# Patient Record
Sex: FEMALE | Race: BLACK OR AFRICAN AMERICAN | Employment: OTHER | URBAN - NONMETROPOLITAN AREA
[De-identification: names, ages, dates, MRNs, and addresses within clinical notes are randomized per-mention and may not be internally consistent; named-entity substitution may affect disease eponyms.]

---

## 2017-07-24 ENCOUNTER — HOSPITAL ENCOUNTER (OUTPATIENT)
Dept: CT IMAGING | Age: 57
Discharge: HOME | End: 2017-07-24
Payer: COMMERCIAL

## 2017-07-24 VITALS
BODY MASS INDEX: 37.21 KG/M2 | DIASTOLIC BLOOD PRESSURE: 68 MMHG | WEIGHT: 210 LBS | HEIGHT: 63 IN | SYSTOLIC BLOOD PRESSURE: 136 MMHG | OXYGEN SATURATION: 99 % | HEART RATE: 62 BPM | TEMPERATURE: 97.4 F | RESPIRATION RATE: 17 BRPM

## 2017-07-24 DIAGNOSIS — C85.90 LYMPHOMA, UNSPECIFIED BODY REGION, UNSPECIFIED LYMPHOMA TYPE (HCC): ICD-10-CM

## 2017-07-24 DIAGNOSIS — D46.9 MDS (MYELODYSPLASTIC SYNDROME) (HCC): ICD-10-CM

## 2017-07-24 LAB
BASOPHILS ABSOLUTE: 0.1 K/UL (ref 0–0.2)
BASOPHILS RELATIVE PERCENT: 0.6 % (ref 0–1)
EOSINOPHILS ABSOLUTE: 0.1 K/UL (ref 0–0.6)
EOSINOPHILS RELATIVE PERCENT: 1.6 % (ref 0–5)
HCT VFR BLD CALC: 36.2 % (ref 37–47)
HEMOGLOBIN: 10.8 G/DL (ref 12–16)
LYMPHOCYTES ABSOLUTE: 0.9 K/UL (ref 1.1–4.5)
LYMPHOCYTES RELATIVE PERCENT: 11.2 % (ref 20–40)
MCH RBC QN AUTO: 26.8 PG (ref 27–31)
MCHC RBC AUTO-ENTMCNC: 29.8 G/DL (ref 33–37)
MCV RBC AUTO: 89.8 FL (ref 81–99)
MONOCYTES ABSOLUTE: 0.6 K/UL (ref 0–0.9)
MONOCYTES RELATIVE PERCENT: 7 % (ref 0–10)
NEUTROPHILS ABSOLUTE: 6.4 K/UL (ref 1.5–7.5)
NEUTROPHILS RELATIVE PERCENT: 78.9 % (ref 50–65)
PDW BLD-RTO: 17.5 % (ref 11.5–14.5)
PLATELET # BLD: 227 K/UL (ref 130–400)
PMV BLD AUTO: 11.6 FL (ref 9.4–12.3)
RBC # BLD: 4.03 M/UL (ref 4.2–5.4)
WBC # BLD: 8.1 K/UL (ref 4.8–10.8)

## 2017-07-24 PROCEDURE — 88305 TISSUE EXAM BY PATHOLOGIST: CPT

## 2017-07-24 PROCEDURE — 88311 DECALCIFY TISSUE: CPT

## 2017-07-24 PROCEDURE — 88185 FLOWCYTOMETRY/TC ADD-ON: CPT

## 2017-07-24 PROCEDURE — 85025 COMPLETE CBC W/AUTO DIFF WBC: CPT

## 2017-07-24 PROCEDURE — 6360000002 HC RX W HCPCS: Performed by: RADIOLOGY

## 2017-07-24 PROCEDURE — 88184 FLOWCYTOMETRY/ TC 1 MARKER: CPT

## 2017-07-24 PROCEDURE — 88313 SPECIAL STAINS GROUP 2: CPT

## 2017-07-24 PROCEDURE — 2500000003 HC RX 250 WO HCPCS: Performed by: RADIOLOGY

## 2017-07-24 PROCEDURE — 38221 DX BONE MARROW BIOPSIES: CPT

## 2017-07-24 RX ORDER — FENTANYL CITRATE 50 UG/ML
INJECTION, SOLUTION INTRAMUSCULAR; INTRAVENOUS
Status: COMPLETED | OUTPATIENT
Start: 2017-07-24 | End: 2017-07-24

## 2017-07-24 RX ORDER — CARBOXYMETHYLCELLULOSE SODIUM 5 MG/ML
1 SOLUTION/ DROPS OPHTHALMIC 4 TIMES DAILY PRN
COMMUNITY

## 2017-07-24 RX ORDER — ACETAMINOPHEN 500 MG
500 TABLET ORAL 3 TIMES DAILY PRN
COMMUNITY

## 2017-07-24 RX ORDER — LEVOTHYROXINE SODIUM 175 UG/1
175 TABLET ORAL DAILY
COMMUNITY

## 2017-07-24 RX ORDER — MIDAZOLAM HYDROCHLORIDE 1 MG/ML
INJECTION INTRAMUSCULAR; INTRAVENOUS
Status: COMPLETED | OUTPATIENT
Start: 2017-07-24 | End: 2017-07-24

## 2017-07-24 RX ORDER — M-VIT,TX,IRON,MINS/CALC/FOLIC 27MG-0.4MG
1 TABLET ORAL DAILY
COMMUNITY

## 2017-07-24 RX ORDER — HYDROCHLOROTHIAZIDE 25 MG/1
25 TABLET ORAL DAILY
COMMUNITY

## 2017-07-24 RX ORDER — LIDOCAINE HYDROCHLORIDE 20 MG/ML
INJECTION, SOLUTION INFILTRATION; PERINEURAL
Status: COMPLETED | OUTPATIENT
Start: 2017-07-24 | End: 2017-07-24

## 2017-07-24 RX ORDER — ISOSORBIDE MONONITRATE 60 MG/1
60 TABLET, EXTENDED RELEASE ORAL DAILY
COMMUNITY

## 2017-07-24 RX ORDER — SODIUM CHLORIDE 0.9 % (FLUSH) 0.9 %
10 SYRINGE (ML) INJECTION PRN
Status: DISCONTINUED | OUTPATIENT
Start: 2017-07-24 | End: 2017-07-26 | Stop reason: HOSPADM

## 2017-07-24 RX ORDER — LIDOCAINE HYDROCHLORIDE 20 MG/ML
INJECTION, SOLUTION INFILTRATION; PERINEURAL
Status: DISPENSED
Start: 2017-07-24 | End: 2017-07-24

## 2017-07-24 RX ORDER — FENTANYL CITRATE 50 UG/ML
INJECTION, SOLUTION INTRAMUSCULAR; INTRAVENOUS
Status: DISCONTINUED
Start: 2017-07-24 | End: 2017-07-24 | Stop reason: WASHOUT

## 2017-07-24 RX ORDER — SODIUM CHLORIDE 0.9 % (FLUSH) 0.9 %
10 SYRINGE (ML) INJECTION EVERY 12 HOURS SCHEDULED
Status: DISCONTINUED | OUTPATIENT
Start: 2017-07-24 | End: 2017-07-26 | Stop reason: HOSPADM

## 2017-07-24 RX ORDER — SODIUM CHLORIDE 0.9 % (FLUSH) 0.9 %
10 SYRINGE (ML) INJECTION PRN
Status: DISCONTINUED | OUTPATIENT
Start: 2017-07-24 | End: 2017-07-24 | Stop reason: CLARIF

## 2017-07-24 RX ORDER — MIDAZOLAM HYDROCHLORIDE 1 MG/ML
INJECTION INTRAMUSCULAR; INTRAVENOUS
Status: DISCONTINUED
Start: 2017-07-24 | End: 2017-07-24 | Stop reason: WASHOUT

## 2017-07-24 RX ORDER — FOLIC ACID 1 MG/1
1 TABLET ORAL DAILY
COMMUNITY

## 2017-07-24 RX ORDER — CHLORAL HYDRATE 500 MG
3000 CAPSULE ORAL 3 TIMES DAILY
COMMUNITY

## 2017-07-24 RX ORDER — TRAMADOL HYDROCHLORIDE 50 MG/1
50 TABLET ORAL EVERY 8 HOURS PRN
COMMUNITY

## 2017-07-24 RX ORDER — LORATADINE 10 MG/1
10 CAPSULE, LIQUID FILLED ORAL DAILY
COMMUNITY

## 2017-07-24 RX ORDER — POTASSIUM CHLORIDE 1.5 G/1.77G
20 POWDER, FOR SOLUTION ORAL 2 TIMES DAILY
COMMUNITY

## 2017-07-24 RX ORDER — FUROSEMIDE 40 MG/1
40 TABLET ORAL 2 TIMES DAILY
COMMUNITY

## 2017-07-24 RX ADMIN — FENTANYL CITRATE 25 MCG: 50 INJECTION INTRAMUSCULAR; INTRAVENOUS at 11:34

## 2017-07-24 RX ADMIN — LIDOCAINE HYDROCHLORIDE 5 ML: 20 INJECTION, SOLUTION INFILTRATION; PERINEURAL at 11:24

## 2017-07-24 RX ADMIN — MIDAZOLAM HYDROCHLORIDE 1 MG: 1 INJECTION, SOLUTION INTRAMUSCULAR; INTRAVENOUS at 11:34

## 2017-07-24 RX ADMIN — MIDAZOLAM HYDROCHLORIDE 1 MG: 1 INJECTION, SOLUTION INTRAMUSCULAR; INTRAVENOUS at 11:24

## 2017-07-24 RX ADMIN — FENTANYL CITRATE 25 MCG: 50 INJECTION INTRAMUSCULAR; INTRAVENOUS at 11:24

## 2017-07-24 RX ADMIN — FENTANYL CITRATE 50 MCG: 50 INJECTION INTRAMUSCULAR; INTRAVENOUS at 11:43

## 2017-07-24 ASSESSMENT — PAIN - FUNCTIONAL ASSESSMENT
PAIN_FUNCTIONAL_ASSESSMENT: 0-10
PAIN_FUNCTIONAL_ASSESSMENT: 0-10

## 2017-11-13 RX ORDER — FUROSEMIDE 20 MG/1
20 TABLET ORAL DAILY PRN
COMMUNITY

## 2017-11-13 RX ORDER — POTASSIUM CHLORIDE 1.5 G/1.77G
20 POWDER, FOR SOLUTION ORAL DAILY
COMMUNITY

## 2017-11-13 RX ORDER — ALBUTEROL SULFATE 2.5 MG/3ML
2.5 SOLUTION RESPIRATORY (INHALATION) EVERY 4 HOURS PRN
Status: ON HOLD | COMMUNITY
End: 2019-09-09

## 2017-11-13 RX ORDER — LEVOTHYROXINE SODIUM 0.07 MG/1
175 TABLET ORAL DAILY
COMMUNITY

## 2017-11-13 RX ORDER — HYDROCHLOROTHIAZIDE 25 MG/1
25 TABLET ORAL DAILY
Status: ON HOLD | COMMUNITY
End: 2020-11-02

## 2017-11-13 RX ORDER — TRAMADOL HYDROCHLORIDE 50 MG/1
50 TABLET ORAL 3 TIMES DAILY PRN
COMMUNITY

## 2017-11-13 RX ORDER — ACETAMINOPHEN 500 MG
500 TABLET ORAL EVERY 6 HOURS PRN
Status: ON HOLD | COMMUNITY
End: 2019-09-09

## 2017-11-13 RX ORDER — ISOSORBIDE MONONITRATE 120 MG/1
120 TABLET, EXTENDED RELEASE ORAL DAILY
COMMUNITY

## 2017-11-13 RX ORDER — OMEPRAZOLE 20 MG/1
20 CAPSULE, DELAYED RELEASE ORAL DAILY
Status: ON HOLD | COMMUNITY
End: 2020-11-02

## 2017-11-13 RX ORDER — FOLIC ACID 1 MG/1
1 TABLET ORAL DAILY
Status: ON HOLD | COMMUNITY
End: 2020-11-02

## 2017-11-13 RX ORDER — LORATADINE 10 MG/1
10 TABLET ORAL DAILY
COMMUNITY

## 2017-11-13 RX ORDER — CEPHALEXIN 500 MG/1
500 CAPSULE ORAL 2 TIMES DAILY
COMMUNITY
End: 2019-09-12 | Stop reason: HOSPADM

## 2017-11-13 RX ORDER — CHLORAL HYDRATE 500 MG
1000 CAPSULE ORAL 2 TIMES DAILY WITH MEALS
COMMUNITY

## 2017-11-13 RX ORDER — TRIAMCINOLONE ACETONIDE 1 MG/G
CREAM TOPICAL 2 TIMES DAILY
Status: ON HOLD | COMMUNITY
End: 2020-11-02

## 2017-11-13 RX ORDER — PANTOPRAZOLE SODIUM 40 MG/1
40 TABLET, DELAYED RELEASE ORAL DAILY
Status: ON HOLD | COMMUNITY
End: 2020-11-02

## 2019-09-09 ENCOUNTER — HOSPITAL ENCOUNTER (INPATIENT)
Facility: HOSPITAL | Age: 59
LOS: 3 days | Discharge: HOME | End: 2019-09-12
Attending: INTERNAL MEDICINE | Admitting: INTERNAL MEDICINE

## 2019-09-09 DIAGNOSIS — D64.9 SYMPTOMATIC ANEMIA: Primary | ICD-10-CM

## 2019-09-09 PROBLEM — D62 ACUTE ON CHRONIC BLOOD LOSS ANEMIA: Status: ACTIVE | Noted: 2019-09-09

## 2019-09-09 PROBLEM — J44.9 COPD (CHRONIC OBSTRUCTIVE PULMONARY DISEASE) (HCC): Status: ACTIVE | Noted: 2019-09-09

## 2019-09-09 PROBLEM — M32.9 LUPUS (HCC): Status: ACTIVE | Noted: 2019-09-09

## 2019-09-09 PROBLEM — E66.01 MORBID OBESITY (HCC): Status: ACTIVE | Noted: 2019-09-09

## 2019-09-09 PROBLEM — K92.2 GI BLEED: Status: ACTIVE | Noted: 2019-09-09

## 2019-09-09 LAB
ABO GROUP BLD: NORMAL
ALBUMIN SERPL-MCNC: 4 G/DL (ref 3.5–5)
ALBUMIN/GLOB SERPL: 1.2 G/DL (ref 1.1–2.5)
ALP SERPL-CCNC: 71 U/L (ref 24–120)
ALT SERPL W P-5'-P-CCNC: 21 U/L (ref 0–54)
ANION GAP SERPL CALCULATED.3IONS-SCNC: 5 MMOL/L (ref 4–13)
AST SERPL-CCNC: 33 U/L (ref 7–45)
BASOPHILS # BLD AUTO: 0.05 10*3/MM3 (ref 0–0.2)
BASOPHILS NFR BLD AUTO: 0.6 % (ref 0–1.5)
BILIRUB SERPL-MCNC: 0.5 MG/DL (ref 0.1–1)
BLD GP AB SCN SERPL QL: NEGATIVE
BUN BLD-MCNC: 17 MG/DL (ref 5–21)
BUN/CREAT SERPL: 25.4 (ref 7–25)
C AG RBC QL: NEGATIVE
CALCIUM SPEC-SCNC: 9 MG/DL (ref 8.4–10.4)
CHLORIDE SERPL-SCNC: 103 MMOL/L (ref 98–110)
CO2 SERPL-SCNC: 31 MMOL/L (ref 24–31)
CREAT BLD-MCNC: 0.67 MG/DL (ref 0.5–1.4)
DEPRECATED RDW RBC AUTO: 47.7 FL (ref 37–54)
EOSINOPHIL # BLD AUTO: 0.08 10*3/MM3 (ref 0–0.4)
EOSINOPHIL NFR BLD AUTO: 0.9 % (ref 0.3–6.2)
ERYTHROCYTE [DISTWIDTH] IN BLOOD BY AUTOMATED COUNT: 15.9 % (ref 12.3–15.4)
GFR SERPL CREATININE-BSD FRML MDRD: 110 ML/MIN/1.73
GLOBULIN UR ELPH-MCNC: 3.3 GM/DL
GLUCOSE BLD-MCNC: 91 MG/DL (ref 70–100)
HCT VFR BLD AUTO: 20.2 % (ref 34–46.6)
HGB BLD-MCNC: 5.9 G/DL (ref 12–15.9)
IMM GRANULOCYTES # BLD AUTO: 0.09 10*3/MM3 (ref 0–0.05)
IMM GRANULOCYTES NFR BLD AUTO: 1 % (ref 0–0.5)
LYMPHOCYTES # BLD AUTO: 1.08 10*3/MM3 (ref 0.7–3.1)
LYMPHOCYTES NFR BLD AUTO: 12.1 % (ref 19.6–45.3)
MCH RBC QN AUTO: 24 PG (ref 26.6–33)
MCHC RBC AUTO-ENTMCNC: 29.2 G/DL (ref 31.5–35.7)
MCV RBC AUTO: 82.1 FL (ref 79–97)
MONOCYTES # BLD AUTO: 0.84 10*3/MM3 (ref 0.1–0.9)
MONOCYTES NFR BLD AUTO: 9.4 % (ref 5–12)
NEUTROPHILS # BLD AUTO: 6.79 10*3/MM3 (ref 1.7–7)
NEUTROPHILS NFR BLD AUTO: 76 % (ref 42.7–76)
NRBC BLD AUTO-RTO: 0 /100 WBC (ref 0–0.2)
PLATELET # BLD AUTO: 357 10*3/MM3 (ref 140–450)
PMV BLD AUTO: 10.4 FL (ref 6–12)
POTASSIUM BLD-SCNC: 3.8 MMOL/L (ref 3.5–5.3)
PROT SERPL-MCNC: 7.3 G/DL (ref 6.3–8.7)
RBC # BLD AUTO: 2.46 10*6/MM3 (ref 3.77–5.28)
RH BLD: POSITIVE
SODIUM BLD-SCNC: 139 MMOL/L (ref 135–145)
T&S EXPIRATION DATE: NORMAL
TROPONIN I SERPL-MCNC: <0.012 NG/ML (ref 0–0.03)
TSH SERPL DL<=0.05 MIU/L-ACNC: 3.05 UIU/ML (ref 0.47–4.68)
WBC NRBC COR # BLD: 8.93 10*3/MM3 (ref 3.4–10.8)

## 2019-09-09 PROCEDURE — 86905 BLOOD TYPING RBC ANTIGENS: CPT | Performed by: INTERNAL MEDICINE

## 2019-09-09 PROCEDURE — 86850 RBC ANTIBODY SCREEN: CPT | Performed by: INTERNAL MEDICINE

## 2019-09-09 PROCEDURE — 84466 ASSAY OF TRANSFERRIN: CPT | Performed by: NURSE PRACTITIONER

## 2019-09-09 PROCEDURE — 84484 ASSAY OF TROPONIN QUANT: CPT | Performed by: INTERNAL MEDICINE

## 2019-09-09 PROCEDURE — 86900 BLOOD TYPING SEROLOGIC ABO: CPT

## 2019-09-09 PROCEDURE — 83036 HEMOGLOBIN GLYCOSYLATED A1C: CPT | Performed by: INTERNAL MEDICINE

## 2019-09-09 PROCEDURE — 86901 BLOOD TYPING SEROLOGIC RH(D): CPT | Performed by: INTERNAL MEDICINE

## 2019-09-09 PROCEDURE — 86920 COMPATIBILITY TEST SPIN: CPT

## 2019-09-09 PROCEDURE — 94760 N-INVAS EAR/PLS OXIMETRY 1: CPT

## 2019-09-09 PROCEDURE — 94799 UNLISTED PULMONARY SVC/PX: CPT

## 2019-09-09 PROCEDURE — 84443 ASSAY THYROID STIM HORMONE: CPT | Performed by: INTERNAL MEDICINE

## 2019-09-09 PROCEDURE — P9016 RBC LEUKOCYTES REDUCED: HCPCS

## 2019-09-09 PROCEDURE — 86922 COMPATIBILITY TEST ANTIGLOB: CPT

## 2019-09-09 PROCEDURE — 80053 COMPREHEN METABOLIC PANEL: CPT | Performed by: INTERNAL MEDICINE

## 2019-09-09 PROCEDURE — 86902 BLOOD TYPE ANTIGEN DONOR EA: CPT

## 2019-09-09 PROCEDURE — 36430 TRANSFUSION BLD/BLD COMPNT: CPT

## 2019-09-09 PROCEDURE — 86900 BLOOD TYPING SEROLOGIC ABO: CPT | Performed by: INTERNAL MEDICINE

## 2019-09-09 PROCEDURE — 85025 COMPLETE CBC W/AUTO DIFF WBC: CPT | Performed by: INTERNAL MEDICINE

## 2019-09-09 PROCEDURE — 83540 ASSAY OF IRON: CPT | Performed by: NURSE PRACTITIONER

## 2019-09-09 RX ORDER — ACETAMINOPHEN 160 MG/5ML
650 SOLUTION ORAL EVERY 4 HOURS PRN
Status: DISCONTINUED | OUTPATIENT
Start: 2019-09-09 | End: 2019-09-12 | Stop reason: HOSPADM

## 2019-09-09 RX ORDER — SIMVASTATIN 20 MG
10 TABLET ORAL NIGHTLY
COMMUNITY

## 2019-09-09 RX ORDER — ACETAMINOPHEN 325 MG/1
650 TABLET ORAL EVERY 4 HOURS PRN
Status: DISCONTINUED | OUTPATIENT
Start: 2019-09-09 | End: 2019-09-12 | Stop reason: HOSPADM

## 2019-09-09 RX ORDER — SODIUM CHLORIDE 0.9 % (FLUSH) 0.9 %
10 SYRINGE (ML) INJECTION EVERY 12 HOURS SCHEDULED
Status: DISCONTINUED | OUTPATIENT
Start: 2019-09-09 | End: 2019-09-12 | Stop reason: HOSPADM

## 2019-09-09 RX ORDER — ALBUTEROL SULFATE 2.5 MG/3ML
2.5 SOLUTION RESPIRATORY (INHALATION) EVERY 4 HOURS PRN
COMMUNITY

## 2019-09-09 RX ORDER — NICOTINE POLACRILEX 4 MG
15 LOZENGE BUCCAL
Status: DISCONTINUED | OUTPATIENT
Start: 2019-09-09 | End: 2019-09-12 | Stop reason: HOSPADM

## 2019-09-09 RX ORDER — ACETAMINOPHEN 650 MG/1
650 SUPPOSITORY RECTAL EVERY 4 HOURS PRN
Status: DISCONTINUED | OUTPATIENT
Start: 2019-09-09 | End: 2019-09-12 | Stop reason: HOSPADM

## 2019-09-09 RX ORDER — ONDANSETRON 2 MG/ML
4 INJECTION INTRAMUSCULAR; INTRAVENOUS EVERY 6 HOURS PRN
Status: DISCONTINUED | OUTPATIENT
Start: 2019-09-09 | End: 2019-09-12 | Stop reason: HOSPADM

## 2019-09-09 RX ORDER — ONDANSETRON 4 MG/1
4 TABLET, FILM COATED ORAL EVERY 6 HOURS PRN
Status: DISCONTINUED | OUTPATIENT
Start: 2019-09-09 | End: 2019-09-12 | Stop reason: HOSPADM

## 2019-09-09 RX ORDER — DEXTROSE MONOHYDRATE 25 G/50ML
25 INJECTION, SOLUTION INTRAVENOUS
Status: DISCONTINUED | OUTPATIENT
Start: 2019-09-09 | End: 2019-09-12 | Stop reason: HOSPADM

## 2019-09-09 RX ORDER — METHOCARBAMOL 500 MG/1
500 TABLET, FILM COATED ORAL 2 TIMES DAILY PRN
COMMUNITY

## 2019-09-09 RX ORDER — IPRATROPIUM BROMIDE AND ALBUTEROL SULFATE 2.5; .5 MG/3ML; MG/3ML
3 SOLUTION RESPIRATORY (INHALATION) EVERY 4 HOURS PRN
Status: DISCONTINUED | OUTPATIENT
Start: 2019-09-09 | End: 2019-09-12 | Stop reason: HOSPADM

## 2019-09-09 RX ORDER — MULTIPLE VITAMINS W/ MINERALS TAB 9MG-400MCG
1 TAB ORAL DAILY
COMMUNITY

## 2019-09-09 RX ORDER — SODIUM CHLORIDE 0.9 % (FLUSH) 0.9 %
10 SYRINGE (ML) INJECTION AS NEEDED
Status: DISCONTINUED | OUTPATIENT
Start: 2019-09-09 | End: 2019-09-12 | Stop reason: HOSPADM

## 2019-09-09 RX ADMIN — SODIUM CHLORIDE, POTASSIUM CHLORIDE, SODIUM LACTATE AND CALCIUM CHLORIDE 500 ML: 600; 310; 30; 20 INJECTION, SOLUTION INTRAVENOUS at 19:50

## 2019-09-09 RX ADMIN — SODIUM CHLORIDE 8 MG/HR: 900 INJECTION INTRAVENOUS at 19:00

## 2019-09-10 ENCOUNTER — TELEPHONE (OUTPATIENT)
Dept: HEMATOLOGY | Age: 59
End: 2019-09-10

## 2019-09-10 ENCOUNTER — ANESTHESIA EVENT (OUTPATIENT)
Dept: GASTROENTEROLOGY | Facility: HOSPITAL | Age: 59
End: 2019-09-10

## 2019-09-10 ENCOUNTER — ANESTHESIA (OUTPATIENT)
Dept: GASTROENTEROLOGY | Facility: HOSPITAL | Age: 59
End: 2019-09-10

## 2019-09-10 PROBLEM — K31.89 GASTRIC MASS: Status: ACTIVE | Noted: 2019-09-10

## 2019-09-10 LAB
AMPHET+METHAMPHET UR QL: NEGATIVE
ANION GAP SERPL CALCULATED.3IONS-SCNC: 9 MMOL/L (ref 5–15)
BARBITURATES UR QL SCN: NEGATIVE
BENZODIAZ UR QL SCN: NEGATIVE
BUN BLD-MCNC: 10 MG/DL (ref 6–20)
BUN/CREAT SERPL: 18.9 (ref 7–25)
CALCIUM SPEC-SCNC: 8.5 MG/DL (ref 8.6–10.5)
CANNABINOIDS SERPL QL: NEGATIVE
CHLORIDE SERPL-SCNC: 104 MMOL/L (ref 98–107)
CO2 SERPL-SCNC: 28 MMOL/L (ref 22–29)
COCAINE UR QL: NEGATIVE
CREAT BLD-MCNC: 0.53 MG/DL (ref 0.57–1)
DEPRECATED RDW RBC AUTO: 44.7 FL (ref 37–54)
ERYTHROCYTE [DISTWIDTH] IN BLOOD BY AUTOMATED COUNT: 15.3 % (ref 12.3–15.4)
GFR SERPL CREATININE-BSD FRML MDRD: 144 ML/MIN/1.73
GLUCOSE BLD-MCNC: 78 MG/DL (ref 65–99)
GLUCOSE BLDC GLUCOMTR-MCNC: 101 MG/DL (ref 70–130)
GLUCOSE BLDC GLUCOMTR-MCNC: 69 MG/DL (ref 70–130)
GLUCOSE BLDC GLUCOMTR-MCNC: 79 MG/DL (ref 70–130)
GLUCOSE BLDC GLUCOMTR-MCNC: 80 MG/DL (ref 70–130)
HBA1C MFR BLD: 4.6 % (ref 4.8–5.9)
HCT VFR BLD AUTO: 24.7 % (ref 34–46.6)
HCT VFR BLD AUTO: 29.3 % (ref 34–46.6)
HEMOCCULT STL QL: POSITIVE
HGB BLD-MCNC: 7.6 G/DL (ref 12–15.9)
HGB BLD-MCNC: 9.1 G/DL (ref 12–15.9)
IRON 24H UR-MRATE: 14 MCG/DL (ref 37–145)
IRON SATN MFR SERPL: 3 % (ref 20–50)
MCH RBC QN AUTO: 25.2 PG (ref 26.6–33)
MCHC RBC AUTO-ENTMCNC: 30.8 G/DL (ref 31.5–35.7)
MCV RBC AUTO: 81.8 FL (ref 79–97)
METHADONE UR QL SCN: NEGATIVE
OPIATES UR QL: NEGATIVE
PCP UR QL SCN: NEGATIVE
PLATELET # BLD AUTO: 357 10*3/MM3 (ref 140–450)
PMV BLD AUTO: 11.4 FL (ref 6–12)
POTASSIUM BLD-SCNC: 4.2 MMOL/L (ref 3.5–5.2)
RBC # BLD AUTO: 3.02 10*6/MM3 (ref 3.77–5.28)
SODIUM BLD-SCNC: 141 MMOL/L (ref 136–145)
TIBC SERPL-MCNC: 508 MCG/DL (ref 298–536)
TRANSFERRIN SERPL-MCNC: 341 MG/DL (ref 200–360)
WBC NRBC COR # BLD: 8.26 10*3/MM3 (ref 3.4–10.8)

## 2019-09-10 PROCEDURE — 80307 DRUG TEST PRSMV CHEM ANLYZR: CPT | Performed by: INTERNAL MEDICINE

## 2019-09-10 PROCEDURE — 85027 COMPLETE CBC AUTOMATED: CPT | Performed by: INTERNAL MEDICINE

## 2019-09-10 PROCEDURE — 43235 EGD DIAGNOSTIC BRUSH WASH: CPT | Performed by: INTERNAL MEDICINE

## 2019-09-10 PROCEDURE — 82962 GLUCOSE BLOOD TEST: CPT

## 2019-09-10 PROCEDURE — 80048 BASIC METABOLIC PNL TOTAL CA: CPT | Performed by: INTERNAL MEDICINE

## 2019-09-10 PROCEDURE — P9016 RBC LEUKOCYTES REDUCED: HCPCS

## 2019-09-10 PROCEDURE — 99222 1ST HOSP IP/OBS MODERATE 55: CPT | Performed by: NURSE PRACTITIONER

## 2019-09-10 PROCEDURE — 82272 OCCULT BLD FECES 1-3 TESTS: CPT | Performed by: INTERNAL MEDICINE

## 2019-09-10 PROCEDURE — 25010000002 PROPOFOL 10 MG/ML EMULSION: Performed by: NURSE ANESTHETIST, CERTIFIED REGISTERED

## 2019-09-10 PROCEDURE — 85018 HEMOGLOBIN: CPT | Performed by: INTERNAL MEDICINE

## 2019-09-10 PROCEDURE — 94799 UNLISTED PULMONARY SVC/PX: CPT

## 2019-09-10 PROCEDURE — 0DJ08ZZ INSPECTION OF UPPER INTESTINAL TRACT, VIA NATURAL OR ARTIFICIAL OPENING ENDOSCOPIC: ICD-10-PCS | Performed by: INTERNAL MEDICINE

## 2019-09-10 PROCEDURE — 86900 BLOOD TYPING SEROLOGIC ABO: CPT

## 2019-09-10 PROCEDURE — 85014 HEMATOCRIT: CPT | Performed by: INTERNAL MEDICINE

## 2019-09-10 PROCEDURE — 94760 N-INVAS EAR/PLS OXIMETRY 1: CPT

## 2019-09-10 PROCEDURE — 83010 ASSAY OF HAPTOGLOBIN QUANT: CPT | Performed by: INTERNAL MEDICINE

## 2019-09-10 PROCEDURE — 36430 TRANSFUSION BLD/BLD COMPNT: CPT

## 2019-09-10 RX ORDER — ISOSORBIDE MONONITRATE 60 MG/1
60 TABLET, EXTENDED RELEASE ORAL DAILY
Status: DISCONTINUED | OUTPATIENT
Start: 2019-09-11 | End: 2019-09-12 | Stop reason: HOSPADM

## 2019-09-10 RX ORDER — LEVOTHYROXINE SODIUM 0.07 MG/1
75 TABLET ORAL
Status: DISCONTINUED | OUTPATIENT
Start: 2019-09-11 | End: 2019-09-12 | Stop reason: HOSPADM

## 2019-09-10 RX ORDER — SODIUM CHLORIDE 0.9 % (FLUSH) 0.9 %
10 SYRINGE (ML) INJECTION AS NEEDED
Status: DISCONTINUED | OUTPATIENT
Start: 2019-09-10 | End: 2019-09-10 | Stop reason: HOSPADM

## 2019-09-10 RX ORDER — SODIUM CHLORIDE 9 MG/ML
500 INJECTION, SOLUTION INTRAVENOUS CONTINUOUS PRN
Status: DISCONTINUED | OUTPATIENT
Start: 2019-09-10 | End: 2019-09-12 | Stop reason: HOSPADM

## 2019-09-10 RX ORDER — TRAMADOL HYDROCHLORIDE 50 MG/1
50 TABLET ORAL 3 TIMES DAILY PRN
Status: DISCONTINUED | OUTPATIENT
Start: 2019-09-10 | End: 2019-09-12 | Stop reason: HOSPADM

## 2019-09-10 RX ORDER — PROPOFOL 10 MG/ML
VIAL (ML) INTRAVENOUS AS NEEDED
Status: DISCONTINUED | OUTPATIENT
Start: 2019-09-10 | End: 2019-09-10 | Stop reason: SURG

## 2019-09-10 RX ORDER — HYDROCHLOROTHIAZIDE 25 MG/1
25 TABLET ORAL DAILY
Status: DISCONTINUED | OUTPATIENT
Start: 2019-09-11 | End: 2019-09-12 | Stop reason: HOSPADM

## 2019-09-10 RX ORDER — METHOCARBAMOL 500 MG/1
500 TABLET, FILM COATED ORAL 2 TIMES DAILY PRN
Status: DISCONTINUED | OUTPATIENT
Start: 2019-09-10 | End: 2019-09-12 | Stop reason: HOSPADM

## 2019-09-10 RX ORDER — PANTOPRAZOLE SODIUM 40 MG/1
40 TABLET, DELAYED RELEASE ORAL DAILY
Status: DISCONTINUED | OUTPATIENT
Start: 2019-09-11 | End: 2019-09-12 | Stop reason: HOSPADM

## 2019-09-10 RX ORDER — ALBUTEROL SULFATE 2.5 MG/3ML
2.5 SOLUTION RESPIRATORY (INHALATION) EVERY 4 HOURS PRN
Status: DISCONTINUED | OUTPATIENT
Start: 2019-09-10 | End: 2019-09-10

## 2019-09-10 RX ORDER — ATORVASTATIN CALCIUM 10 MG/1
10 TABLET, FILM COATED ORAL NIGHTLY
Status: DISCONTINUED | OUTPATIENT
Start: 2019-09-10 | End: 2019-09-12 | Stop reason: HOSPADM

## 2019-09-10 RX ADMIN — SODIUM CHLORIDE 8 MG/HR: 900 INJECTION INTRAVENOUS at 08:18

## 2019-09-10 RX ADMIN — LIDOCAINE HYDROCHLORIDE 100 MG: 20 INJECTION, SOLUTION INTRAVENOUS at 12:48

## 2019-09-10 RX ADMIN — SODIUM CHLORIDE 8 MG/HR: 900 INJECTION INTRAVENOUS at 14:30

## 2019-09-10 RX ADMIN — PROPOFOL 60 MG: 10 INJECTION, EMULSION INTRAVENOUS at 12:43

## 2019-09-10 RX ADMIN — SODIUM CHLORIDE 8 MG/HR: 900 INJECTION INTRAVENOUS at 02:49

## 2019-09-10 RX ADMIN — SODIUM CHLORIDE, PRESERVATIVE FREE 10 ML: 5 INJECTION INTRAVENOUS at 21:42

## 2019-09-10 RX ADMIN — ATORVASTATIN CALCIUM 10 MG: 10 TABLET, FILM COATED ORAL at 22:49

## 2019-09-10 RX ADMIN — TRAMADOL HYDROCHLORIDE 50 MG: 50 TABLET, FILM COATED ORAL at 22:49

## 2019-09-10 RX ADMIN — LIDOCAINE HYDROCHLORIDE 100 MG: 20 INJECTION, SOLUTION INTRAVENOUS at 12:43

## 2019-09-10 RX ADMIN — SODIUM CHLORIDE 500 ML: 0.9 INJECTION, SOLUTION INTRAVENOUS at 12:35

## 2019-09-10 NOTE — CONSULTS
[facility]  Initial Inpatient Consult Note    Referring Provider: Kerwin Christian MD    Reason for Consultation: Anemia    Subjective     History of present illness:      This is a 58-year-old female who was transferred to our facility for anemia.  Patient has a known history of iron deficiency anemia but has noted increase in shortness of breath.  Routine lab work yesterday showed patient to have a hemoglobin of 6.3 she was sent to local ER with a hemoglobin found to 5.9 and an MCV of 87.4.  She was found to have heme positive stool outlying facility.  She denies observation of bright red blood but does report her stools have been dark.  Denies use of anti-inflammatories.  She denies abdominal pain.  She does carry a history of heartburn that is controlled with daily PPI.  She denies dysphasia.  No weight loss, appetite is stable.  Reports history of celiac disease however it is unclear how this was diagnosed.  Last endoscopy and colonoscopy in 2017 or 2018, she does carry a personal history of colon polyps.    Past Medical History:  Past Medical History:   Diagnosis Date   • Anemia    • Anxiety    • Bilateral calcaneal spurs    • COPD (chronic obstructive pulmonary disease) (CMS/HCC)    • Disease of thyroid gland    • Diverticulitis    • Dry eyes    • Fibromyalgia    • Headache    • Hiatal hernia    • History of GI bleed    • Hyperlipidemia    • Hypertension    • Lupus    • Sinusitis        Past Surgical History:  Past Surgical History:   Procedure Laterality Date   • ABDOMINAL SURGERY     • LEG SURGERY          Social History:   Social History     Tobacco Use   • Smoking status: Never Smoker   • Smokeless tobacco: Never Used   Substance Use Topics   • Alcohol use: Not on file        Family History:  History reviewed. No pertinent family history.    Home Meds:  Medications Prior to Admission   Medication Sig Dispense Refill Last Dose   • albuterol (PROVENTIL) (2.5 MG/3ML) 0.083% nebulizer  solution Take 2.5 mg by nebulization Every 4 (Four) Hours As Needed for Wheezing.      • ipratropium-albuterol (COMBIVENT RESPIMAT)  MCG/ACT inhaler Inhale 1 puff 4 (Four) Times a Day As Needed for Wheezing.      • Liraglutide -Weight Management (SAXENDA SC) Inject 6 mg/mL under the skin into the appropriate area as directed. Take 0.5mL once daily subcutaneou      • methocarbamol (ROBAXIN) 500 MG tablet Take 500 mg by mouth 2 (Two) Times a Day As Needed for Muscle Spasms. Take 1-2 tablets by mouth 2 times daily as needed      • Multiple Vitamins-Minerals (MULTIVITAMIN WITH MINERALS) tablet tablet Take 1 tablet by mouth Daily.      • simvastatin (ZOCOR) 20 MG tablet Take 20 mg by mouth Every Night. Take 1/2 tablet by mouth nightly      • cephalexin (KEFLEX) 500 MG capsule Take 500 mg by mouth 2 (Two) Times a Day.      • Cholecalciferol 2000 units tablet Take 2,000 Units by mouth Daily. 2 tablets once daily      • folic acid (FOLVITE) 1 MG tablet Take 1 mg by mouth Daily.      • furosemide (LASIX) 40 MG tablet Take 20 mg by mouth Daily As Needed. Take half tablet every morning as needed      • hydrochlorothiazide (HYDRODIURIL) 25 MG tablet Take 25 mg by mouth Daily.      • isosorbide mononitrate (IMDUR) 60 MG 24 hr tablet Take 60 mg by mouth Daily. Take one and 1/2 tablets by mouth once daily      • LACTOBACILLUS PO Take  by mouth 2 (Two) Times a Day. Take one by mouth two times daily      • levothyroxine (SYNTHROID, LEVOTHROID) 75 MCG tablet Take 75 mcg by mouth Daily.      • loratadine (CLARITIN) 10 MG tablet Take 10 mg by mouth Daily.      • Multiple Vitamin (MULTI VITAMIN PO) Take  by mouth.      • Omega-3 Fatty Acids (FISH OIL) 1000 MG capsule capsule Take  by mouth Daily With Breakfast.      • omeprazole (priLOSEC) 20 MG capsule Take 20 mg by mouth Daily.      • pantoprazole (PROTONIX) 40 MG EC tablet Take 40 mg by mouth Daily.      • potassium chloride (KLOR-CON) 20 MEQ packet Take 20 mEq by mouth 2 (Two)  "Times a Day.      • traMADol (ULTRAM) 50 MG tablet Take 50 mg by mouth 3 (Three) Times a Day As Needed for Moderate Pain .      • triamcinolone (KENALOG) 0.1 % cream Apply  topically 2 (Two) Times a Day.          Current Meds:   Hospital Medications (active)       Dose Frequency Start End    acetaminophen (TYLENOL) 160 MG/5ML solution 650 mg 650 mg Every 4 Hours PRN 9/9/2019     Sig - Route: Take 20.3 mL by mouth Every 4 (Four) Hours As Needed for Mild Pain . - Oral    Linked Group 1:  \"Or\" Linked Group Details        acetaminophen (TYLENOL) suppository 650 mg 650 mg Every 4 Hours PRN 9/9/2019     Sig - Route: Insert 1 suppository into the rectum Every 4 (Four) Hours As Needed for Mild Pain . - Rectal    Linked Group 1:  \"Or\" Linked Group Details        acetaminophen (TYLENOL) tablet 650 mg 650 mg Every 4 Hours PRN 9/9/2019     Sig - Route: Take 2 tablets by mouth Every 4 (Four) Hours As Needed for Mild Pain . - Oral    Linked Group 1:  \"Or\" Linked Group Details        dextrose (D50W) 25 g/ 50mL Intravenous Solution 25 g 25 g Every 15 Minutes PRN 9/9/2019     Sig - Route: Infuse 50 mL into a venous catheter Every 15 (Fifteen) Minutes As Needed for Low Blood Sugar (Blood Sugar Less Than 70). - Intravenous    dextrose (GLUTOSE) oral gel 15 g 15 g Every 15 Minutes PRN 9/9/2019     Sig - Route: Take 15 application by mouth Every 15 (Fifteen) Minutes As Needed for Low Blood Sugar (Blood sugar less than 70). - Oral    glucagon (human recombinant) (GLUCAGEN DIAGNOSTIC) injection 1 mg 1 mg Every 15 Minutes PRN 9/9/2019     Sig - Route: Inject 1 mg under the skin into the appropriate area as directed Every 15 (Fifteen) Minutes As Needed for Low Blood Sugar (Blood Glucose Less Than 70). - Subcutaneous    insulin lispro (humaLOG) injection 0-9 Units 0-9 Units 4 Times Daily With Meals & Nightly 9/10/2019     Sig - Route: Inject 0-9 Units under the skin into the appropriate area as directed 4 (Four) Times a Day With Meals & at " "Bedtime. - Subcutaneous    ipratropium-albuterol (DUO-NEB) nebulizer solution 3 mL 3 mL Every 4 Hours PRN 9/9/2019     Sig - Route: Take 3 mL by nebulization Every 4 (Four) Hours As Needed for Shortness of Air. - Nebulization    lactated ringers bolus 500 mL 500 mL Once 9/9/2019 9/9/2019    Sig - Route: Infuse 500 mL into a venous catheter 1 (One) Time. - Intravenous    ondansetron (ZOFRAN) injection 4 mg 4 mg Every 6 Hours PRN 9/9/2019     Sig - Route: Infuse 2 mL into a venous catheter Every 6 (Six) Hours As Needed for Nausea or Vomiting. - Intravenous    Linked Group 2:  \"Or\" Linked Group Details        ondansetron (ZOFRAN) tablet 4 mg 4 mg Every 6 Hours PRN 9/9/2019     Sig - Route: Take 1 tablet by mouth Every 6 (Six) Hours As Needed for Nausea or Vomiting. - Oral    Linked Group 2:  \"Or\" Linked Group Details        pantoprazole (PROTONIX) 40 mg/100 mL (0.4 mg/mL) in 0.9% NS IVPB 8 mg/hr Continuous 9/9/2019     Sig - Route: Infuse 8 mg/hr into a venous catheter Continuous. - Intravenous    sodium chloride 0.9 % flush 10 mL 10 mL Every 12 Hours Scheduled 9/9/2019     Sig - Route: Infuse 10 mL into a venous catheter Every 12 (Twelve) Hours. - Intravenous    sodium chloride 0.9 % flush 10 mL 10 mL As Needed 9/9/2019     Sig - Route: Infuse 10 mL into a venous catheter As Needed for Line Care. - Intravenous          Allergies:  Allergies   Allergen Reactions   • Motrin [Ibuprofen] Other (See Comments)     Unknown other than sleepiness   • Prednisone Other (See Comments)     unknown       Vital Signs  Temp:  [97.5 °F (36.4 °C)-98.1 °F (36.7 °C)] 97.8 °F (36.6 °C)  Heart Rate:  [59-73] 70  Resp:  [16-18] 18  BP: (114-165)/(50-87) 145/77  Body mass index is 44.14 kg/m².    Intake/Output Summary (Last 24 hours) at 9/10/2019 1008  Last data filed at 9/10/2019 0858  Gross per 24 hour   Intake 610 ml   Output 750 ml   Net -140 ml     I/O this shift:  In: 300 [Blood:300]  Out: 400 [Urine:400]    Review of Systems "     Constitution:  negative for chills, and fevers, increased fatigue  Eyes:  negative for blurriness and change of vision  ENT:   negative for sore throat and voice change  Respiratory: negative for  cough and positive for shortness of air  Cardiovascular:  Negative for chest pain or palpitations  Gastrointestinal:  negative for  See HPI  Genitourinary:  negativefor  blood in urine and painful urination  Integument: negative for  rash and redness  Hematologic / Lymphatic: negative for  excessive bleeding and easy bruising  Musculoskeletal: negative for  joint pain and joint stiffness out of the ordinary  Neurological:  negative for  seizures and speech abnormal  Behavioral/Psych:  negative for  anxiety and depression out of the ordinary  Endocrine: negative for  diabetes:and weight loss, unintended  Allergies / Immunologic:  negative for  anaphylaxis and rash      Objective     Physical Exam:  General :    Alert, cooperative, in no acute distress   Head:    Normocephalic, without obvious abnormality, atraumatic   Eyes:            Lids and lashes normal, conjunctivae and sclerae normal, no   Icterus, conjunctival pallor   Throat:   No oral lesions, no thrush, oral mucosa moist, posterior oropharynx clear   Neck:   No adenopathy, supple, trachea midline, no thyromegaly, no   carotid bruit, no JVD   Lungs:     Clear to auscultation,respirations regular, even and                   unlabored    Heart:    Regular rhythm and normal rate, normal S1 and S2, no            murmur   Chest Wall:    No abnormalities observed   Abdomen:     Normal bowel sounds, no masses, no organomegaly, soft        non-tender, non-distended, no guarding, no rebound                 tenderness   Rectal:     Deferred   Extremities:   no edema, no cyanosis   Skin:   No open lesions, bruising or rash   Lymph nodes:   No palpable cervical adenopathy   Psychiatric:  Judgement and insight: normal   Orientation to person place and time: normal   Mood  and affect: normal        Results Review:    I have reviewed all of the patients current test results  Results from last 7 days   Lab Units 09/10/19  0706 09/09/19  1916   WBC 10*3/mm3 8.26 8.93   HEMOGLOBIN g/dL 7.6* 5.9*   HEMATOCRIT % 24.7* 20.2*   PLATELETS 10*3/mm3 357 357       Results from last 7 days   Lab Units 09/10/19  0706 09/09/19  1916   SODIUM mmol/L 141 139   POTASSIUM mmol/L 4.2 3.8   CHLORIDE mmol/L 104 103   CO2 mmol/L 28.0 31.0   BUN mg/dL 10 17   CREATININE mg/dL 0.53* 0.67   CALCIUM mg/dL 8.5* 9.0   BILIRUBIN mg/dL  --  0.5   ALK PHOS U/L  --  71   ALT (SGPT) U/L  --  21   AST (SGOT) U/L  --  33   GLUCOSE mg/dL 78 91             No results found for: LIPASE    Radiology:    Imaging Results (last 24 hours)     ** No results found for the last 24 hours. **            Assessment/Plan     Patient Active Problem List   Diagnosis Code   • GI bleed K92.2   • Symptomatic anemia D64.9   • Acute on chronic blood loss anemia (CMS/HCC) D60.0   • Morbid obesity (CMS/HCC) E66.01   • Lupus L93.0   • COPD (chronic obstructive pulmonary disease) (CMS/[facility]) J44.9       Impression/Plan    Anemia, acute on chronic  Heme positive stool-outlying facility  GERD    She has received 2 units of packed red blood cells since admission, hemoglobin at 7 AM this morning 7.6.  Fecal occult blood test has been ordered  but has not been collected.  Plan on endoscopy evaluation today with Dr. Cope.  Risk benefits indications discussed with patient she was agreeable to proceed.  Continue PPI drip until endoscopy evaluation.  Further orders pending findings on endoscopy.    Jacob Roach, ROSA 10:00 AM 09/10/19        Ilya Cope DO  09/10/19  10:08 AM

## 2019-09-10 NOTE — H&P
[facility] Medicine Services  HISTORY AND PHYSICAL    Date of Admission: 9/9/2019  Primary Care Physician: Curtis Anderson MD    Subjective     Chief Complaint: anemia    History of Present Illness    Ms. Tan is a 57 yo F with multiple medical co-morbidities that presents with symptomatic anemia.  Patient has had chronic iron deficiency anemia who has received IV iron and PO iron and previously had to receive transfusions in the past due to symptomatic anemia.  Patient presents after a PCP visit today as she was concerned about anemia.  Patient was having exertion shortness of breath, significant fatigue, and intolerance to heat.  At her PCP she was found to have a hemoglobin of 6.3.  She was told to present to the ER at which point the hemoglobin was 5.9.  Patient was noted to have an MCV of 87.4.  Patient does report dark stools, but is on iron supplementation.  She was FOBT positive reportedly at OSH.  She denies NSAIDs.    She was scheduled to have evaluation by Dr. Larios reportedly in November.      At the OSH BMP showed low potassium but otherwise normal.      Troponin was unremarkable at the OSH.        Review of Systems   Constitutional: Positive for activity change and fatigue. Negative for chills and fever.   Respiratory: Positive for chest tightness and shortness of breath. Negative for cough.    Cardiovascular: Negative for chest pain and palpitations.   Gastrointestinal: Positive for blood in stool. Negative for abdominal distention, abdominal pain, constipation, diarrhea, nausea and vomiting.   Endocrine: Positive for heat intolerance.   Musculoskeletal: Negative for arthralgias and myalgias.   Neurological: Positive for weakness.   All other systems reviewed and are negative.       Otherwise complete ROS reviewed and negative except as mentioned in the HPI.    Past Medical History:   Past Medical History:   Diagnosis Date   • Anxiety    • Disease of  thyroid gland    • Hyperlipidemia      Past Surgical History:  Orthopedic surgeries   LN Bx  Endoscopy      Social History:  reports that she has never smoked. She has never used smokeless tobacco. She reports that she does not drink alcohol or use drugs.    Family History: HTN, DM    Allergies:  Allergies   Allergen Reactions   • Motrin [Ibuprofen]    • Prednisone      Medications:  Prior to Admission medications    Medication Sig Start Date End Date Taking? Authorizing Provider   ipratropium-albuterol (COMBIVENT RESPIMAT)  MCG/ACT inhaler Inhale 1 puff 4 (Four) Times a Day As Needed for Wheezing.   Yes Den Randall MD   acetaminophen (TYLENOL) 500 MG tablet Take 500 mg by mouth Every 6 (Six) Hours As Needed for Mild Pain .    Den Randall MD   cephalexin (KEFLEX) 500 MG capsule Take 500 mg by mouth 2 (Two) Times a Day.    Den Randall MD   cholecalciferol (VITAMIN D3) 82181 units capsule Take 50,000 Units by mouth Daily.    Den Randall MD   folic acid (FOLVITE) 1 MG tablet Take 1 mg by mouth Daily.    Den Randall MD   furosemide (LASIX) 40 MG tablet Take 40 mg by mouth 2 (Two) Times a Day.    Den Randall MD   hydrochlorothiazide (HYDRODIURIL) 25 MG tablet Take 25 mg by mouth Daily.    Den Randall MD   isosorbide mononitrate (IMDUR) 60 MG 24 hr tablet Take 60 mg by mouth Daily.    Den Randall MD   LACTOBACILLUS PO Take  by mouth.    Den Randall MD   levothyroxine (SYNTHROID, LEVOTHROID) 75 MCG tablet Take 75 mcg by mouth Daily.    Den Randall MD   loratadine (CLARITIN) 10 MG tablet Take 10 mg by mouth Daily.    Den Randall MD   Multiple Vitamin (MULTI VITAMIN PO) Take  by mouth.    Den Randall MD   Omega-3 Fatty Acids (FISH OIL) 1000 MG capsule capsule Take  by mouth Daily With Breakfast.    Den Randall MD   omeprazole (priLOSEC) 20 MG capsule Take 20 mg by mouth Daily.    Prakash  "MD Den   pantoprazole (PROTONIX) 40 MG EC tablet Take 40 mg by mouth Daily.    ProviderDen MD   potassium chloride (KLOR-CON) 20 MEQ packet Take 20 mEq by mouth 2 (Two) Times a Day.    Den Randall MD   traMADol (ULTRAM) 50 MG tablet Take 50 mg by mouth Every 6 (Six) Hours As Needed for Moderate Pain .    Den Randall MD   triamcinolone (KENALOG) 0.1 % cream Apply  topically 2 (Two) Times a Day.    Den Randall MD   albuterol (PROVENTIL) (2.5 MG/3ML) 0.083% nebulizer solution Take 2.5 mg by nebulization Every 4 (Four) Hours As Needed for Wheezing.  9/9/19  Den Randall MD     Objective     Vital Signs: /50 (BP Location: Left arm, Patient Position: Sitting)   Pulse 72   Temp 98.1 °F (36.7 °C) (Oral)   Resp 17   Ht 160 cm (63\")   Wt 113 kg (249 lb 3.2 oz)   SpO2 99%   BMI 44.14 kg/m²   Physical Exam   Constitutional: She is oriented to person, place, and time. No distress.   HENT:   Head: Normocephalic and atraumatic.   Eyes: Conjunctivae are normal. No scleral icterus.   Neck: Neck supple. No JVD present.   Cardiovascular: Normal rate, regular rhythm and intact distal pulses.   Murmur heard.  Pulmonary/Chest: Effort normal and breath sounds normal. No stridor. No respiratory distress. She has no wheezes. She has no rales.   Abdominal: Soft. Bowel sounds are normal. She exhibits no distension. There is no tenderness. There is no guarding.   Musculoskeletal: She exhibits no edema.   Neurological: She is alert and oriented to person, place, and time.   Skin: Skin is warm and dry. Capillary refill takes less than 2 seconds. She is not diaphoretic. No erythema.   Psychiatric: She has a normal mood and affect. Her behavior is normal.   Nursing note and vitals reviewed.          Results Reviewed:  Lab Results (last 24 hours)     Procedure Component Value Units Date/Time    Comprehensive Metabolic Panel [[id number]]  (Abnormal) Collected:  09/09/19 1916    " Specimen:  Blood Updated:  09/09/19 1943     Glucose 91 mg/dL      BUN 17 mg/dL      Creatinine 0.67 mg/dL      Sodium 139 mmol/L      Potassium 3.8 mmol/L      Chloride 103 mmol/L      CO2 31.0 mmol/L      Calcium 9.0 mg/dL      Total Protein 7.3 g/dL      Albumin 4.00 g/dL      ALT (SGPT) 21 U/L      AST (SGOT) 33 U/L      Alkaline Phosphatase 71 U/L      Total Bilirubin 0.5 mg/dL      eGFR  African Amer 110 mL/min/1.73      Globulin 3.3 gm/dL      A/G Ratio 1.2 g/dL      BUN/Creatinine Ratio 25.4     Anion Gap 5.0 mmol/L     Narrative:       GFR Normal >60  Chronic Kidney Disease <60  Kidney Failure <15    CBC Auto Differential [[id number]]  (Abnormal) Collected:  09/09/19 1916    Specimen:  Blood Updated:  09/09/19 1937     WBC 8.93 10*3/mm3      RBC 2.46 10*6/mm3      Hemoglobin 5.9 g/dL      Hematocrit 20.2 %      MCV 82.1 fL      MCH 24.0 pg      MCHC 29.2 g/dL      RDW 15.9 %      RDW-SD 47.7 fl      MPV 10.4 fL      Platelets 357 10*3/mm3      Neutrophil % 76.0 %      Lymphocyte % 12.1 %      Monocyte % 9.4 %      Eosinophil % 0.9 %      Basophil % 0.6 %      Immature Grans % 1.0 %      Neutrophils, Absolute 6.79 10*3/mm3      Lymphocytes, Absolute 1.08 10*3/mm3      Monocytes, Absolute 0.84 10*3/mm3      Eosinophils, Absolute 0.08 10*3/mm3      Basophils, Absolute 0.05 10*3/mm3      Immature Grans, Absolute 0.09 10*3/mm3      nRBC 0.0 /100 WBC     TSH [[id number]] Collected:  09/09/19 1916    Specimen:  Blood Updated:  09/09/19 1924    Troponin [[id number]] Collected:  09/09/19 1916    Specimen:  Blood Updated:  09/09/19 1924        Imaging Results (last 24 hours)     ** No results found for the last 24 hours. **        I have personally reviewed and interpreted the radiology studies and ECG obtained at time of admission.     Assessment / Plan     Assessment:   Active Hospital Problems    Diagnosis   • GI bleed   • Symptomatic anemia   • Acute on chronic blood loss anemia (CMS/HCC)   • Morbid obesity  (CMS/[facility])   • Lupus   • COPD (chronic obstructive pulmonary disease) (CMS/[facility])       Plan:   1.  Admit to medicine  2.  Type and screen  3.  Cross match for 4 units, transfuse 2 units  4.  Continue PPI gtt  5.  FOBT  6.  GI consultation  7.  Suspect patient may have a component of MDS in addition to a blood loss anemia  8.  NPO  9.  SCDs for DVT PPx  10.  Awaiting medicine reconciliation prior to resuming home medications and patient is NPO anyways         Code Status: Full code    Sister will make decisions for her if she is unable     I discussed the patient's findings and my recommendations with the patient and bedside RN     Estimated length of stay 2-4    Kerwin Christian MD   09/09/19   7:50 PM

## 2019-09-10 NOTE — H&P (VIEW-ONLY)
[facility]astroenterology  Initial Inpatient Consult Note    Referring Provider: Kerwin Christian MD    Reason for Consultation: Anemia    Subjective     History of present illness:      This is a 58-year-old female who was transferred to our facility for anemia.  Patient has a known history of iron deficiency anemia but has noted increase in shortness of breath.  Routine lab work yesterday showed patient to have a hemoglobin of 6.3 she was sent to local ER with a hemoglobin found to 5.9 and an MCV of 87.4.  She was found to have heme positive stool outlying facility.  She denies observation of bright red blood but does report her stools have been dark.  Denies use of anti-inflammatories.  She denies abdominal pain.  She does carry a history of heartburn that is controlled with daily PPI.  She denies dysphasia.  No weight loss, appetite is stable.  Reports history of celiac disease however it is unclear how this was diagnosed.  Last endoscopy and colonoscopy in 2017 or 2018, she does carry a personal history of colon polyps.    Past Medical History:  Past Medical History:   Diagnosis Date   • Anemia    • Anxiety    • Bilateral calcaneal spurs    • COPD (chronic obstructive pulmonary disease) (CMS/HCC)    • Disease of thyroid gland    • Diverticulitis    • Dry eyes    • Fibromyalgia    • Headache    • Hiatal hernia    • History of GI bleed    • Hyperlipidemia    • Hypertension    • Lupus    • Sinusitis        Past Surgical History:  Past Surgical History:   Procedure Laterality Date   • ABDOMINAL SURGERY     • LEG SURGERY          Social History:   Social History     Tobacco Use   • Smoking status: Never Smoker   • Smokeless tobacco: Never Used   Substance Use Topics   • Alcohol use: Not on file        Family History:  History reviewed. No pertinent family history.    Home Meds:  Medications Prior to Admission   Medication Sig Dispense Refill Last Dose   • albuterol (PROVENTIL) (2.5 MG/3ML) 0.083% nebulizer  solution Take 2.5 mg by nebulization Every 4 (Four) Hours As Needed for Wheezing.      • ipratropium-albuterol (COMBIVENT RESPIMAT)  MCG/ACT inhaler Inhale 1 puff 4 (Four) Times a Day As Needed for Wheezing.      • Liraglutide -Weight Management (SAXENDA SC) Inject 6 mg/mL under the skin into the appropriate area as directed. Take 0.5mL once daily subcutaneou      • methocarbamol (ROBAXIN) 500 MG tablet Take 500 mg by mouth 2 (Two) Times a Day As Needed for Muscle Spasms. Take 1-2 tablets by mouth 2 times daily as needed      • Multiple Vitamins-Minerals (MULTIVITAMIN WITH MINERALS) tablet tablet Take 1 tablet by mouth Daily.      • simvastatin (ZOCOR) 20 MG tablet Take 20 mg by mouth Every Night. Take 1/2 tablet by mouth nightly      • cephalexin (KEFLEX) 500 MG capsule Take 500 mg by mouth 2 (Two) Times a Day.      • Cholecalciferol 2000 units tablet Take 2,000 Units by mouth Daily. 2 tablets once daily      • folic acid (FOLVITE) 1 MG tablet Take 1 mg by mouth Daily.      • furosemide (LASIX) 40 MG tablet Take 20 mg by mouth Daily As Needed. Take half tablet every morning as needed      • hydrochlorothiazide (HYDRODIURIL) 25 MG tablet Take 25 mg by mouth Daily.      • isosorbide mononitrate (IMDUR) 60 MG 24 hr tablet Take 60 mg by mouth Daily. Take one and 1/2 tablets by mouth once daily      • LACTOBACILLUS PO Take  by mouth 2 (Two) Times a Day. Take one by mouth two times daily      • levothyroxine (SYNTHROID, LEVOTHROID) 75 MCG tablet Take 75 mcg by mouth Daily.      • loratadine (CLARITIN) 10 MG tablet Take 10 mg by mouth Daily.      • Multiple Vitamin (MULTI VITAMIN PO) Take  by mouth.      • Omega-3 Fatty Acids (FISH OIL) 1000 MG capsule capsule Take  by mouth Daily With Breakfast.      • omeprazole (priLOSEC) 20 MG capsule Take 20 mg by mouth Daily.      • pantoprazole (PROTONIX) 40 MG EC tablet Take 40 mg by mouth Daily.      • potassium chloride (KLOR-CON) 20 MEQ packet Take 20 mEq by mouth 2 (Two)  "Times a Day.      • traMADol (ULTRAM) 50 MG tablet Take 50 mg by mouth 3 (Three) Times a Day As Needed for Moderate Pain .      • triamcinolone (KENALOG) 0.1 % cream Apply  topically 2 (Two) Times a Day.          Current Meds:   Hospital Medications (active)       Dose Frequency Start End    acetaminophen (TYLENOL) 160 MG/5ML solution 650 mg 650 mg Every 4 Hours PRN 9/9/2019     Sig - Route: Take 20.3 mL by mouth Every 4 (Four) Hours As Needed for Mild Pain . - Oral    Linked Group 1:  \"Or\" Linked Group Details        acetaminophen (TYLENOL) suppository 650 mg 650 mg Every 4 Hours PRN 9/9/2019     Sig - Route: Insert 1 suppository into the rectum Every 4 (Four) Hours As Needed for Mild Pain . - Rectal    Linked Group 1:  \"Or\" Linked Group Details        acetaminophen (TYLENOL) tablet 650 mg 650 mg Every 4 Hours PRN 9/9/2019     Sig - Route: Take 2 tablets by mouth Every 4 (Four) Hours As Needed for Mild Pain . - Oral    Linked Group 1:  \"Or\" Linked Group Details        dextrose (D50W) 25 g/ 50mL Intravenous Solution 25 g 25 g Every 15 Minutes PRN 9/9/2019     Sig - Route: Infuse 50 mL into a venous catheter Every 15 (Fifteen) Minutes As Needed for Low Blood Sugar (Blood Sugar Less Than 70). - Intravenous    dextrose (GLUTOSE) oral gel 15 g 15 g Every 15 Minutes PRN 9/9/2019     Sig - Route: Take 15 application by mouth Every 15 (Fifteen) Minutes As Needed for Low Blood Sugar (Blood sugar less than 70). - Oral    glucagon (human recombinant) (GLUCAGEN DIAGNOSTIC) injection 1 mg 1 mg Every 15 Minutes PRN 9/9/2019     Sig - Route: Inject 1 mg under the skin into the appropriate area as directed Every 15 (Fifteen) Minutes As Needed for Low Blood Sugar (Blood Glucose Less Than 70). - Subcutaneous    insulin lispro (humaLOG) injection 0-9 Units 0-9 Units 4 Times Daily With Meals & Nightly 9/10/2019     Sig - Route: Inject 0-9 Units under the skin into the appropriate area as directed 4 (Four) Times a Day With Meals & at " "Bedtime. - Subcutaneous    ipratropium-albuterol (DUO-NEB) nebulizer solution 3 mL 3 mL Every 4 Hours PRN 9/9/2019     Sig - Route: Take 3 mL by nebulization Every 4 (Four) Hours As Needed for Shortness of Air. - Nebulization    lactated ringers bolus 500 mL 500 mL Once 9/9/2019 9/9/2019    Sig - Route: Infuse 500 mL into a venous catheter 1 (One) Time. - Intravenous    ondansetron (ZOFRAN) injection 4 mg 4 mg Every 6 Hours PRN 9/9/2019     Sig - Route: Infuse 2 mL into a venous catheter Every 6 (Six) Hours As Needed for Nausea or Vomiting. - Intravenous    Linked Group 2:  \"Or\" Linked Group Details        ondansetron (ZOFRAN) tablet 4 mg 4 mg Every 6 Hours PRN 9/9/2019     Sig - Route: Take 1 tablet by mouth Every 6 (Six) Hours As Needed for Nausea or Vomiting. - Oral    Linked Group 2:  \"Or\" Linked Group Details        pantoprazole (PROTONIX) 40 mg/100 mL (0.4 mg/mL) in 0.9% NS IVPB 8 mg/hr Continuous 9/9/2019     Sig - Route: Infuse 8 mg/hr into a venous catheter Continuous. - Intravenous    sodium chloride 0.9 % flush 10 mL 10 mL Every 12 Hours Scheduled 9/9/2019     Sig - Route: Infuse 10 mL into a venous catheter Every 12 (Twelve) Hours. - Intravenous    sodium chloride 0.9 % flush 10 mL 10 mL As Needed 9/9/2019     Sig - Route: Infuse 10 mL into a venous catheter As Needed for Line Care. - Intravenous          Allergies:  Allergies   Allergen Reactions   • Motrin [Ibuprofen] Other (See Comments)     Unknown other than sleepiness   • Prednisone Other (See Comments)     unknown       Vital Signs  Temp:  [97.5 °F (36.4 °C)-98.1 °F (36.7 °C)] 97.8 °F (36.6 °C)  Heart Rate:  [59-73] 70  Resp:  [16-18] 18  BP: (114-165)/(50-87) 145/77  Body mass index is 44.14 kg/m².    Intake/Output Summary (Last 24 hours) at 9/10/2019 1008  Last data filed at 9/10/2019 0858  Gross per 24 hour   Intake 610 ml   Output 750 ml   Net -140 ml     I/O this shift:  In: 300 [Blood:300]  Out: 400 [Urine:400]    Review of Systems "     Constitution:  negative for chills, and fevers, increased fatigue  Eyes:  negative for blurriness and change of vision  ENT:   negative for sore throat and voice change  Respiratory: negative for  cough and positive for shortness of air  Cardiovascular:  Negative for chest pain or palpitations  Gastrointestinal:  negative for  See HPI  Genitourinary:  negativefor  blood in urine and painful urination  Integument: negative for  rash and redness  Hematologic / Lymphatic: negative for  excessive bleeding and easy bruising  Musculoskeletal: negative for  joint pain and joint stiffness out of the ordinary  Neurological:  negative for  seizures and speech abnormal  Behavioral/Psych:  negative for  anxiety and depression out of the ordinary  Endocrine: negative for  diabetes:and weight loss, unintended  Allergies / Immunologic:  negative for  anaphylaxis and rash      Objective     Physical Exam:  General :    Alert, cooperative, in no acute distress   Head:    Normocephalic, without obvious abnormality, atraumatic   Eyes:            Lids and lashes normal, conjunctivae and sclerae normal, no   Icterus, conjunctival pallor   Throat:   No oral lesions, no thrush, oral mucosa moist, posterior oropharynx clear   Neck:   No adenopathy, supple, trachea midline, no thyromegaly, no   carotid bruit, no JVD   Lungs:     Clear to auscultation,respirations regular, even and                   unlabored    Heart:    Regular rhythm and normal rate, normal S1 and S2, no            murmur   Chest Wall:    No abnormalities observed   Abdomen:     Normal bowel sounds, no masses, no organomegaly, soft        non-tender, non-distended, no guarding, no rebound                 tenderness   Rectal:     Deferred   Extremities:   no edema, no cyanosis   Skin:   No open lesions, bruising or rash   Lymph nodes:   No palpable cervical adenopathy   Psychiatric:  Judgement and insight: normal   Orientation to person place and time: normal   Mood  and affect: normal        Results Review:    I have reviewed all of the patients current test results  Results from last 7 days   Lab Units 09/10/19  0706 09/09/19  1916   WBC 10*3/mm3 8.26 8.93   HEMOGLOBIN g/dL 7.6* 5.9*   HEMATOCRIT % 24.7* 20.2*   PLATELETS 10*3/mm3 357 357       Results from last 7 days   Lab Units 09/10/19  0706 09/09/19  1916   SODIUM mmol/L 141 139   POTASSIUM mmol/L 4.2 3.8   CHLORIDE mmol/L 104 103   CO2 mmol/L 28.0 31.0   BUN mg/dL 10 17   CREATININE mg/dL 0.53* 0.67   CALCIUM mg/dL 8.5* 9.0   BILIRUBIN mg/dL  --  0.5   ALK PHOS U/L  --  71   ALT (SGPT) U/L  --  21   AST (SGOT) U/L  --  33   GLUCOSE mg/dL 78 91             No results found for: LIPASE    Radiology:    Imaging Results (last 24 hours)     ** No results found for the last 24 hours. **            Assessment/Plan     Patient Active Problem List   Diagnosis Code   • GI bleed K92.2   • Symptomatic anemia D64.9   • Acute on chronic blood loss anemia (CMS/HCC) D60.0   • Morbid obesity (CMS/HCC) E66.01   • Lupus L93.0   • COPD (chronic obstructive pulmonary disease) (CMS/[facility]) J44.9       Impression/Plan    Anemia, acute on chronic  Heme positive stool-outlying facility  GERD    She has received 2 units of packed red blood cells since admission, hemoglobin at 7 AM this morning 7.6.  Fecal occult blood test has been ordered  but has not been collected.  Plan on endoscopy evaluation today with Dr. Cope.  Risk benefits indications discussed with patient she was agreeable to proceed.  Continue PPI drip until endoscopy evaluation.  Further orders pending findings on endoscopy.    Jacob Roach, ROSA 10:00 AM 09/10/19        Ilya Cope DO  09/10/19  10:08 AM

## 2019-09-10 NOTE — PROGRESS NOTES
[facility]  INPATIENT PROGRESS NOTE    Patient Name: Dominick Robin  Date of Admission: 9/9/2019  Today's Date: 09/10/19  Length of Stay: 1  Primary Care Physician: Curtis Anderson MD    Subjective   Chief Complaint: chest pain     HPI   The patient is currently sitting up in bed.  She has not had any bowel movement since being admitted.  She denies any nausea or vomiting.  She will be seen by gastroenterology today for further recommendations.  She has received 2 units of blood overnight.    Review of Systems   Constitutional: Positive for activity change and fatigue. Negative for appetite change, chills and fever.   HENT: Negative for hearing loss, nosebleeds, tinnitus and trouble swallowing.    Eyes: Negative for visual disturbance.   Respiratory: Negative for cough, chest tightness, shortness of breath and wheezing.    Cardiovascular: Positive for chest pain. Negative for palpitations and leg swelling.   Gastrointestinal: Positive for blood in stool. Negative for abdominal distention, abdominal pain, constipation, diarrhea, nausea and vomiting.   Endocrine: Negative for cold intolerance, heat intolerance, polydipsia, polyphagia and polyuria.   Genitourinary: Negative for decreased urine volume, difficulty urinating, dysuria, flank pain, frequency and hematuria.   Musculoskeletal: Negative for arthralgias, joint swelling and myalgias.   Skin: Negative for rash.   Allergic/Immunologic: Negative for immunocompromised state.   Neurological: Positive for weakness. Negative for dizziness, syncope, light-headedness and headaches.   Hematological: Negative for adenopathy. Does not bruise/bleed easily.   Psychiatric/Behavioral: Negative for confusion and sleep disturbance. The patient is not nervous/anxious.       All pertinent negatives and positives are as above. All other systems have been reviewed and are negative unless otherwise stated.     Objective     Temp:  [97.5 °F (36.4 °C)-98.1 °F (36.7 °C)] 97.8 °F (36.6 °C)  Heart Rate:  [59-73] 70  Resp:  [16-18] 18  BP: (114-165)/(50-87) 145/77     Intake/Output Summary (Last 24 hours) at 9/10/2019 0938  Last data filed at 9/10/2019 0858  Gross per 24 hour   Intake 610 ml   Output 750 ml   Net -140 ml     Physical Exam   Constitutional: She is oriented to person, place, and time. She appears well-developed and well-nourished.   Sitting up in bed.  NAD.  Morbidly Obese.    HENT:   Head: Normocephalic and atraumatic.   Eyes: Conjunctivae and EOM are normal. Pupils are equal, round, and reactive to light.   Neck: Neck supple. No JVD present. No thyromegaly present.   Cardiovascular: Normal rate, regular rhythm, normal heart sounds and intact distal pulses. Exam reveals no gallop and no friction rub.   No murmur heard.  Pulmonary/Chest: Effort normal and breath sounds normal. No respiratory distress. She has no wheezes. She has no rales. She exhibits no tenderness.   Abdominal: Soft. Bowel sounds are normal. She exhibits no distension. There is no tenderness. There is no rebound and no guarding.   Genitourinary:   Genitourinary Comments: Voiding.    Musculoskeletal: Normal range of motion. She exhibits no edema, tenderness or deformity.   Lymphadenopathy:     She has no cervical adenopathy.   Neurological: She is alert and oriented to person, place, and time.   Skin: Skin is warm and dry. No rash noted.   Psychiatric: She has a normal mood and affect. Her behavior is normal. Judgment and thought content normal.   Nursing note and vitals reviewed.    Results Review:  I have reviewed the labs, radiology results, and diagnostic studies.    Laboratory Data:   Results from last 7 days   Lab Units 09/10/19  0706 09/09/19  1916   WBC 10*3/mm3 8.26 8.93   HEMOGLOBIN g/dL 7.6* 5.9*   HEMATOCRIT % 24.7* 20.2*   PLATELETS 10*3/mm3 357 357     Results from last 7 days   Lab Units 09/10/19  0706 09/09/19  1916   SODIUM mmol/L 141 139    POTASSIUM mmol/L 4.2 3.8   CHLORIDE mmol/L 104 103   CO2 mmol/L 28.0 31.0   BUN mg/dL 10 17   CREATININE mg/dL 0.53* 0.67   CALCIUM mg/dL 8.5* 9.0   BILIRUBIN mg/dL  --  0.5   ALK PHOS U/L  --  71   ALT (SGPT) U/L  --  21   AST (SGOT) U/L  --  33   GLUCOSE mg/dL 78 91       Culture Data:        Radiology Data:   Imaging Results (last 24 hours)     ** No results found for the last 24 hours. **        I have reviewed the patient's current medications.     Assessment/Plan     Active Hospital Problems    Diagnosis   • **Symptomatic anemia   • GI bleed   • Acute on chronic blood loss anemia (CMS/HCC)   • Lupus   • Morbid obesity (CMS/HCC)   • COPD (chronic obstructive pulmonary disease) (CMS/HCC)     Plan:  1.  Upper endoscopy today that revealed a gastric mass that needs an EUS.  2.  Transfused 2 units of packed red blood cells, hemoglobin now 7.6  3.  Monitor for bleeding  4.  Hematology consult  5.  Venofer   6.  H&H at 1500  7.  CBC and BMP in AM    Discharge Planning: I expect the patient to be discharged to home in 1 day.    ROSA Zuluaga   09/10/19   9:36 AM      I personally evaluated and examined the patient in conjunction with ROSA Jay and agree with the assessment, treatment plan, and disposition of the patient as recorded by her. My history, exam, and further recommendations are:     EGD today showed gastric mass that needs EUS.  Hemoglobin improved.      Kerwin Christian MD  09/11/19  9:14 PM

## 2019-09-10 NOTE — PAYOR COMM NOTE
"Renee Zarate (58 y.o. Female) [address]   [facility] phone   Fax      Date of Birth Social Security Number Address Home Phone MRN    1960  [address] [place] [id number] [phone] [id number]    [employer] Marital Status          [facility] Single       Admission Date Admission Type Admitting Provider Attending Provider Department, Room/Bed    9/9/19 Urgent Kerwin Christian MD Fleming, John Eric, MD [facility] 3C, 387/1    Discharge Date Discharge Disposition Discharge Destination                       Attending Provider:  Kerwin Christian MD    Allergies:  Motrin [Ibuprofen], Prednisone    Isolation:  None   Infection:  None   Code Status:  CPR    Ht:  160 cm (63\")   Wt:  113 kg (249 lb 3.2 oz)    Admission Cmt:  None   Principal Problem:  Symptomatic anemia [D64.9]                 Active Insurance as of 9/9/2019     Primary Coverage     Payor Plan Insurance Group Employer/Plan Group    [employer] [facility]      Payor Plan Address Payor Plan Phone Number Payor Plan Fax Number Effective Dates    [address] [phone]  9/9/2019 - None Entered    [facility] [id number]       Subscriber Name Subscriber Birth Date Member ID       RENEE ZARATE 1960 [id number]                 Emergency Contacts      (Rel.) Home Phone Work Phone Mobile Phone    MAGO ZARATE (Sister) [phone] -- [phone]               History & Physical      Kerwin Christian MD at 9/9/2019  7:50 PM              [facility] Medicine Services  HISTORY AND PHYSICAL    Date of Admission: 9/9/2019  Primary Care Physician: Curtis Anderson MD    Subjective     Chief Complaint: anemia    History of Present Illness    Ms. Tan is a 57 yo F with multiple medical co-morbidities that presents with symptomatic anemia.  Patient has had chronic iron deficiency anemia who has received IV iron and PO iron and " previously had to receive transfusions in the past due to symptomatic anemia.  Patient presents after a PCP visit today as she was concerned about anemia.  Patient was having exertion shortness of breath, significant fatigue, and intolerance to heat.  At her PCP she was found to have a hemoglobin of 6.3.  She was told to present to the ER at which point the hemoglobin was 5.9.  Patient was noted to have an MCV of 87.4.  Patient does report dark stools, but is on iron supplementation.  She was FOBT positive reportedly at OSH.  She denies NSAIDs.    She was scheduled to have evaluation by Dr. Larios reportedly in November.      At the OSH BMP showed low potassium but otherwise normal.      Troponin was unremarkable at the OSH.        Review of Systems   Constitutional: Positive for activity change and fatigue. Negative for chills and fever.   Respiratory: Positive for chest tightness and shortness of breath. Negative for cough.    Cardiovascular: Negative for chest pain and palpitations.   Gastrointestinal: Positive for blood in stool. Negative for abdominal distention, abdominal pain, constipation, diarrhea, nausea and vomiting.   Endocrine: Positive for heat intolerance.   Musculoskeletal: Negative for arthralgias and myalgias.   Neurological: Positive for weakness.   All other systems reviewed and are negative.       Otherwise complete ROS reviewed and negative except as mentioned in the HPI.    Past Medical History:   Past Medical History:   Diagnosis Date   • Anxiety    • Disease of thyroid gland    • Hyperlipidemia      Past Surgical History:  Orthopedic surgeries   LN Bx  Endoscopy      Social History:  reports that she has never smoked. She has never used smokeless tobacco. She reports that she does not drink alcohol or use drugs.    Family History: HTN, DM    Allergies:  Allergies   Allergen Reactions   • Motrin [Ibuprofen]    • Prednisone      Medications:  Prior to Admission medications    Medication Sig  Start Date End Date Taking? Authorizing Provider   ipratropium-albuterol (COMBIVENT RESPIMAT)  MCG/ACT inhaler Inhale 1 puff 4 (Four) Times a Day As Needed for Wheezing.   Yes Den Randall MD   acetaminophen (TYLENOL) 500 MG tablet Take 500 mg by mouth Every 6 (Six) Hours As Needed for Mild Pain .    Den Randall MD   cephalexin (KEFLEX) 500 MG capsule Take 500 mg by mouth 2 (Two) Times a Day.    Den Randall MD   cholecalciferol (VITAMIN D3) 40398 units capsule Take 50,000 Units by mouth Daily.    Den Randall MD   folic acid (FOLVITE) 1 MG tablet Take 1 mg by mouth Daily.    Den Randall MD   furosemide (LASIX) 40 MG tablet Take 40 mg by mouth 2 (Two) Times a Day.    Den Randall MD   hydrochlorothiazide (HYDRODIURIL) 25 MG tablet Take 25 mg by mouth Daily.    Den Randall MD   isosorbide mononitrate (IMDUR) 60 MG 24 hr tablet Take 60 mg by mouth Daily.    Den Randall MD   LACTOBACILLUS PO Take  by mouth.    Den Randall MD   levothyroxine (SYNTHROID, LEVOTHROID) 75 MCG tablet Take 75 mcg by mouth Daily.    Den Randall MD   loratadine (CLARITIN) 10 MG tablet Take 10 mg by mouth Daily.    Den Randall MD   Multiple Vitamin (MULTI VITAMIN PO) Take  by mouth.    Den Randall MD   Omega-3 Fatty Acids (FISH OIL) 1000 MG capsule capsule Take  by mouth Daily With Breakfast.    Den Randall MD   omeprazole (priLOSEC) 20 MG capsule Take 20 mg by mouth Daily.    Den Randall MD   pantoprazole (PROTONIX) 40 MG EC tablet Take 40 mg by mouth Daily.    Den Randall MD   potassium chloride (KLOR-CON) 20 MEQ packet Take 20 mEq by mouth 2 (Two) Times a Day.    Den Randall MD   traMADol (ULTRAM) 50 MG tablet Take 50 mg by mouth Every 6 (Six) Hours As Needed for Moderate Pain .    Den Randall MD   triamcinolone (KENALOG) 0.1 % cream Apply  topically 2 (Two) Times a Day.     "Provider, MD Den   albuterol (PROVENTIL) (2.5 MG/3ML) 0.083% nebulizer solution Take 2.5 mg by nebulization Every 4 (Four) Hours As Needed for Wheezing.  9/9/19  ProviderDen MD     Objective     Vital Signs: /50 (BP Location: Left arm, Patient Position: Sitting)   Pulse 72   Temp 98.1 °F (36.7 °C) (Oral)   Resp 17   Ht 160 cm (63\")   Wt 113 kg (249 lb 3.2 oz)   SpO2 99%   BMI 44.14 kg/m²    Physical Exam   Constitutional: She is oriented to person, place, and time. No distress.   HENT:   Head: Normocephalic and atraumatic.   Eyes: Conjunctivae are normal. No scleral icterus.   Neck: Neck supple. No JVD present.   Cardiovascular: Normal rate, regular rhythm and intact distal pulses.   Murmur heard.  Pulmonary/Chest: Effort normal and breath sounds normal. No stridor. No respiratory distress. She has no wheezes. She has no rales.   Abdominal: Soft. Bowel sounds are normal. She exhibits no distension. There is no tenderness. There is no guarding.   Musculoskeletal: She exhibits no edema.   Neurological: She is alert and oriented to person, place, and time.   Skin: Skin is warm and dry. Capillary refill takes less than 2 seconds. She is not diaphoretic. No erythema.   Psychiatric: She has a normal mood and affect. Her behavior is normal.   Nursing note and vitals reviewed.          Results Reviewed:  Lab Results (last 24 hours)     Procedure Component Value Units Date/Time    Comprehensive Metabolic Panel [[id number]]  (Abnormal) Collected:  09/09/19 1916    Specimen:  Blood Updated:  09/09/19 1943     Glucose 91 mg/dL      BUN 17 mg/dL      Creatinine 0.67 mg/dL      Sodium 139 mmol/L      Potassium 3.8 mmol/L      Chloride 103 mmol/L      CO2 31.0 mmol/L      Calcium 9.0 mg/dL      Total Protein 7.3 g/dL      Albumin 4.00 g/dL      ALT (SGPT) 21 U/L      AST (SGOT) 33 U/L      Alkaline Phosphatase 71 U/L      Total Bilirubin 0.5 mg/dL      eGFR  African Amer 110 mL/min/1.73      Globulin " 3.3 gm/dL      A/G Ratio 1.2 g/dL      BUN/Creatinine Ratio 25.4     Anion Gap 5.0 mmol/L     Narrative:       GFR Normal >60  Chronic Kidney Disease <60  Kidney Failure <15    CBC Auto Differential [[id number]]  (Abnormal) Collected:  09/09/19 1916    Specimen:  Blood Updated:  09/09/19 1937     WBC 8.93 10*3/mm3      RBC 2.46 10*6/mm3      Hemoglobin 5.9 g/dL      Hematocrit 20.2 %      MCV 82.1 fL      MCH 24.0 pg      MCHC 29.2 g/dL      RDW 15.9 %      RDW-SD 47.7 fl      MPV 10.4 fL      Platelets 357 10*3/mm3      Neutrophil % 76.0 %      Lymphocyte % 12.1 %      Monocyte % 9.4 %      Eosinophil % 0.9 %      Basophil % 0.6 %      Immature Grans % 1.0 %      Neutrophils, Absolute 6.79 10*3/mm3      Lymphocytes, Absolute 1.08 10*3/mm3      Monocytes, Absolute 0.84 10*3/mm3      Eosinophils, Absolute 0.08 10*3/mm3      Basophils, Absolute 0.05 10*3/mm3      Immature Grans, Absolute 0.09 10*3/mm3      nRBC 0.0 /100 WBC     TSH [[id number]] Collected:  09/09/19 1916    Specimen:  Blood Updated:  09/09/19 1924    Troponin [[id number]] Collected:  09/09/19 1916    Specimen:  Blood Updated:  09/09/19 1924        Imaging Results (last 24 hours)     ** No results found for the last 24 hours. **        I have personally reviewed and interpreted the radiology studies and ECG obtained at time of admission.     Assessment / Plan     Assessment:   Active Hospital Problems    Diagnosis   • GI bleed   • Symptomatic anemia   • Acute on chronic blood loss anemia (CMS/HCC)   • Morbid obesity (CMS/HCC)   • Lupus   • COPD (chronic obstructive pulmonary disease) (CMS/HCC)       Plan:   1.  Admit to medicine  2.  Type and screen  3.  Cross match for 4 units, transfuse 2 units  4.  Continue PPI gtt  5.  FOBT  6.  GI consultation  7.  Suspect patient may have a component of MDS in addition to a blood loss anemia  8.  NPO  9.  SCDs for DVT PPx  10.  Awaiting medicine reconciliation prior to resuming home medications and patient is NPO  "anyways         Code Status: Full code    Sister will make decisions for her if she is unable     I discussed the patient's findings and my recommendations with the patient and bedside RN     Estimated length of stay 2-4    Kerwin Christian MD   09/09/19   7:50 PM            Electronically signed by Kerwin Christian MD at 9/9/2019 10:44 PM       Hospital Medications (active)       Dose Frequency Start End    acetaminophen (TYLENOL) 160 MG/5ML solution 650 mg 650 mg Every 4 Hours PRN 9/9/2019     Sig - Route: Take 20.3 mL by mouth Every 4 (Four) Hours As Needed for Mild Pain . - Oral    Linked Group 1:  \"Or\" Linked Group Details        acetaminophen (TYLENOL) suppository 650 mg 650 mg Every 4 Hours PRN 9/9/2019     Sig - Route: Insert 1 suppository into the rectum Every 4 (Four) Hours As Needed for Mild Pain . - Rectal    Linked Group 1:  \"Or\" Linked Group Details        acetaminophen (TYLENOL) tablet 650 mg 650 mg Every 4 Hours PRN 9/9/2019     Sig - Route: Take 2 tablets by mouth Every 4 (Four) Hours As Needed for Mild Pain . - Oral    Linked Group 1:  \"Or\" Linked Group Details        dextrose (D50W) 25 g/ 50mL Intravenous Solution 25 g 25 g Every 15 Minutes PRN 9/9/2019     Sig - Route: Infuse 50 mL into a venous catheter Every 15 (Fifteen) Minutes As Needed for Low Blood Sugar (Blood Sugar Less Than 70). - Intravenous    dextrose (GLUTOSE) oral gel 15 g 15 g Every 15 Minutes PRN 9/9/2019     Sig - Route: Take 15 application by mouth Every 15 (Fifteen) Minutes As Needed for Low Blood Sugar (Blood sugar less than 70). - Oral    glucagon (human recombinant) (GLUCAGEN DIAGNOSTIC) injection 1 mg 1 mg Every 15 Minutes PRN 9/9/2019     Sig - Route: Inject 1 mg under the skin into the appropriate area as directed Every 15 (Fifteen) Minutes As Needed for Low Blood Sugar (Blood Glucose Less Than 70). - Subcutaneous    insulin lispro (humaLOG) injection 0-9 Units 0-9 Units 4 Times Daily With Meals & Nightly 9/10/2019  " "   Sig - Route: Inject 0-9 Units under the skin into the appropriate area as directed 4 (Four) Times a Day With Meals & at Bedtime. - Subcutaneous    ipratropium-albuterol (DUO-NEB) nebulizer solution 3 mL 3 mL Every 4 Hours PRN 9/9/2019     Sig - Route: Take 3 mL by nebulization Every 4 (Four) Hours As Needed for Shortness of Air. - Nebulization    lactated ringers bolus 500 mL 500 mL Once 9/9/2019 9/9/2019    Sig - Route: Infuse 500 mL into a venous catheter 1 (One) Time. - Intravenous    ondansetron (ZOFRAN) injection 4 mg 4 mg Every 6 Hours PRN 9/9/2019     Sig - Route: Infuse 2 mL into a venous catheter Every 6 (Six) Hours As Needed for Nausea or Vomiting. - Intravenous    Linked Group 2:  \"Or\" Linked Group Details        ondansetron (ZOFRAN) tablet 4 mg 4 mg Every 6 Hours PRN 9/9/2019     Sig - Route: Take 1 tablet by mouth Every 6 (Six) Hours As Needed for Nausea or Vomiting. - Oral    Linked Group 2:  \"Or\" Linked Group Details        pantoprazole (PROTONIX) 40 mg/100 mL (0.4 mg/mL) in 0.9% NS IVPB 8 mg/hr Continuous 9/9/2019     Sig - Route: Infuse 8 mg/hr into a venous catheter Continuous. - Intravenous    sodium chloride 0.9 % flush 10 mL 10 mL Every 12 Hours Scheduled 9/9/2019     Sig - Route: Infuse 10 mL into a venous catheter Every 12 (Twelve) Hours. - Intravenous    sodium chloride 0.9 % flush 10 mL 10 mL As Needed 9/9/2019     Sig - Route: Infuse 10 mL into a venous catheter As Needed for Line Care. - Intravenous             Consult Notes (last 24 hours) (Notes from 9/9/2019 10:18 AM through 9/10/2019 10:18 AM)      Ilya Cope DO at 9/10/2019  7:45 AM      Consult Orders    1. Inpatient Gastroenterology Consult [[id number]] ordered by Kerwin Christian MD at 09/09/19 9770                [facility]  Initial Inpatient Consult Note    Referring Provider: Kerwin Christian MD    Reason for Consultation: Anemia    Subjective     History of present illness:      This is a " 58-year-old female who was transferred to our facility for anemia.  Patient has a known history of iron deficiency anemia but has noted increase in shortness of breath.  Routine lab work yesterday showed patient to have a hemoglobin of 6.3 she was sent to local ER with a hemoglobin found to 5.9 and an MCV of 87.4.  She was found to have heme positive stool outlying facility.  She denies observation of bright red blood but does report her stools have been dark.  Denies use of anti-inflammatories.  She denies abdominal pain.  She does carry a history of heartburn that is controlled with daily PPI.  She denies dysphasia.  No weight loss, appetite is stable.  Reports history of celiac disease however it is unclear how this was diagnosed.  Last endoscopy and colonoscopy in 2017 or 2018, she does carry a personal history of colon polyps.    Past Medical History:  Past Medical History:   Diagnosis Date   • Anemia    • Anxiety    • Bilateral calcaneal spurs    • COPD (chronic obstructive pulmonary disease) (CMS/HCC)    • Disease of thyroid gland    • Diverticulitis    • Dry eyes    • Fibromyalgia    • Headache    • Hiatal hernia    • History of GI bleed    • Hyperlipidemia    • Hypertension    • Lupus    • Sinusitis        Past Surgical History:  Past Surgical History:   Procedure Laterality Date   • ABDOMINAL SURGERY     • LEG SURGERY          Social History:   Social History     Tobacco Use   • Smoking status: Never Smoker   • Smokeless tobacco: Never Used   Substance Use Topics   • Alcohol use: Not on file        Family History:  History reviewed. No pertinent family history.    Home Meds:  Medications Prior to Admission   Medication Sig Dispense Refill Last Dose   • albuterol (PROVENTIL) (2.5 MG/3ML) 0.083% nebulizer solution Take 2.5 mg by nebulization Every 4 (Four) Hours As Needed for Wheezing.      • ipratropium-albuterol (COMBIVENT RESPIMAT)  MCG/ACT inhaler Inhale 1 puff 4 (Four) Times a Day As Needed for  Wheezing.      • Liraglutide -Weight Management (SAXENDA SC) Inject 6 mg/mL under the skin into the appropriate area as directed. Take 0.5mL once daily subcutaneou      • methocarbamol (ROBAXIN) 500 MG tablet Take 500 mg by mouth 2 (Two) Times a Day As Needed for Muscle Spasms. Take 1-2 tablets by mouth 2 times daily as needed      • Multiple Vitamins-Minerals (MULTIVITAMIN WITH MINERALS) tablet tablet Take 1 tablet by mouth Daily.      • simvastatin (ZOCOR) 20 MG tablet Take 20 mg by mouth Every Night. Take 1/2 tablet by mouth nightly      • cephalexin (KEFLEX) 500 MG capsule Take 500 mg by mouth 2 (Two) Times a Day.      • Cholecalciferol 2000 units tablet Take 2,000 Units by mouth Daily. 2 tablets once daily      • folic acid (FOLVITE) 1 MG tablet Take 1 mg by mouth Daily.      • furosemide (LASIX) 40 MG tablet Take 20 mg by mouth Daily As Needed. Take half tablet every morning as needed      • hydrochlorothiazide (HYDRODIURIL) 25 MG tablet Take 25 mg by mouth Daily.      • isosorbide mononitrate (IMDUR) 60 MG 24 hr tablet Take 60 mg by mouth Daily. Take one and 1/2 tablets by mouth once daily      • LACTOBACILLUS PO Take  by mouth 2 (Two) Times a Day. Take one by mouth two times daily      • levothyroxine (SYNTHROID, LEVOTHROID) 75 MCG tablet Take 75 mcg by mouth Daily.      • loratadine (CLARITIN) 10 MG tablet Take 10 mg by mouth Daily.      • Multiple Vitamin (MULTI VITAMIN PO) Take  by mouth.      • Omega-3 Fatty Acids (FISH OIL) 1000 MG capsule capsule Take  by mouth Daily With Breakfast.      • omeprazole (priLOSEC) 20 MG capsule Take 20 mg by mouth Daily.      • pantoprazole (PROTONIX) 40 MG EC tablet Take 40 mg by mouth Daily.      • potassium chloride (KLOR-CON) 20 MEQ packet Take 20 mEq by mouth 2 (Two) Times a Day.      • traMADol (ULTRAM) 50 MG tablet Take 50 mg by mouth 3 (Three) Times a Day As Needed for Moderate Pain .      • triamcinolone (KENALOG) 0.1 % cream Apply  topically 2 (Two) Times a Day.  "         Current Meds:   Hospital Medications (active)       Dose Frequency Start End    acetaminophen (TYLENOL) 160 MG/5ML solution 650 mg 650 mg Every 4 Hours PRN 9/9/2019     Sig - Route: Take 20.3 mL by mouth Every 4 (Four) Hours As Needed for Mild Pain . - Oral    Linked Group 1:  \"Or\" Linked Group Details        acetaminophen (TYLENOL) suppository 650 mg 650 mg Every 4 Hours PRN 9/9/2019     Sig - Route: Insert 1 suppository into the rectum Every 4 (Four) Hours As Needed for Mild Pain . - Rectal    Linked Group 1:  \"Or\" Linked Group Details        acetaminophen (TYLENOL) tablet 650 mg 650 mg Every 4 Hours PRN 9/9/2019     Sig - Route: Take 2 tablets by mouth Every 4 (Four) Hours As Needed for Mild Pain . - Oral    Linked Group 1:  \"Or\" Linked Group Details        dextrose (D50W) 25 g/ 50mL Intravenous Solution 25 g 25 g Every 15 Minutes PRN 9/9/2019     Sig - Route: Infuse 50 mL into a venous catheter Every 15 (Fifteen) Minutes As Needed for Low Blood Sugar (Blood Sugar Less Than 70). - Intravenous    dextrose (GLUTOSE) oral gel 15 g 15 g Every 15 Minutes PRN 9/9/2019     Sig - Route: Take 15 application by mouth Every 15 (Fifteen) Minutes As Needed for Low Blood Sugar (Blood sugar less than 70). - Oral    glucagon (human recombinant) (GLUCAGEN DIAGNOSTIC) injection 1 mg 1 mg Every 15 Minutes PRN 9/9/2019     Sig - Route: Inject 1 mg under the skin into the appropriate area as directed Every 15 (Fifteen) Minutes As Needed for Low Blood Sugar (Blood Glucose Less Than 70). - Subcutaneous    insulin lispro (humaLOG) injection 0-9 Units 0-9 Units 4 Times Daily With Meals & Nightly 9/10/2019     Sig - Route: Inject 0-9 Units under the skin into the appropriate area as directed 4 (Four) Times a Day With Meals & at Bedtime. - Subcutaneous    ipratropium-albuterol (DUO-NEB) nebulizer solution 3 mL 3 mL Every 4 Hours PRN 9/9/2019     Sig - Route: Take 3 mL by nebulization Every 4 (Four) Hours As Needed for Shortness of " "Air. - Nebulization    lactated ringers bolus 500 mL 500 mL Once 9/9/2019 9/9/2019    Sig - Route: Infuse 500 mL into a venous catheter 1 (One) Time. - Intravenous    ondansetron (ZOFRAN) injection 4 mg 4 mg Every 6 Hours PRN 9/9/2019     Sig - Route: Infuse 2 mL into a venous catheter Every 6 (Six) Hours As Needed for Nausea or Vomiting. - Intravenous    Linked Group 2:  \"Or\" Linked Group Details        ondansetron (ZOFRAN) tablet 4 mg 4 mg Every 6 Hours PRN 9/9/2019     Sig - Route: Take 1 tablet by mouth Every 6 (Six) Hours As Needed for Nausea or Vomiting. - Oral    Linked Group 2:  \"Or\" Linked Group Details        pantoprazole (PROTONIX) 40 mg/100 mL (0.4 mg/mL) in 0.9% NS IVPB 8 mg/hr Continuous 9/9/2019     Sig - Route: Infuse 8 mg/hr into a venous catheter Continuous. - Intravenous    sodium chloride 0.9 % flush 10 mL 10 mL Every 12 Hours Scheduled 9/9/2019     Sig - Route: Infuse 10 mL into a venous catheter Every 12 (Twelve) Hours. - Intravenous    sodium chloride 0.9 % flush 10 mL 10 mL As Needed 9/9/2019     Sig - Route: Infuse 10 mL into a venous catheter As Needed for Line Care. - Intravenous          Allergies:  Allergies   Allergen Reactions   • Motrin [Ibuprofen] Other (See Comments)     Unknown other than sleepiness   • Prednisone Other (See Comments)     unknown       Vital Signs  Temp:  [97.5 °F (36.4 °C)-98.1 °F (36.7 °C)] 97.8 °F (36.6 °C)  Heart Rate:  [59-73] 70  Resp:  [16-18] 18  BP: (114-165)/(50-87) 145/77  Body mass index is 44.14 kg/m².    Intake/Output Summary (Last 24 hours) at 9/10/2019 1008  Last data filed at 9/10/2019 0858  Gross per 24 hour   Intake 610 ml   Output 750 ml   Net -140 ml     I/O this shift:  In: 300 [Blood:300]  Out: 400 [Urine:400]    Review of Systems     Constitution:  negative for chills, and fevers, increased fatigue  Eyes:  negative for blurriness and change of vision  ENT:   negative for sore throat and voice change  Respiratory: negative for  cough and " positive for shortness of air  Cardiovascular:  Negative for chest pain or palpitations  Gastrointestinal:  negative for  See HPI  Genitourinary:  negativefor  blood in urine and painful urination  Integument: negative for  rash and redness  Hematologic / Lymphatic: negative for  excessive bleeding and easy bruising  Musculoskeletal: negative for  joint pain and joint stiffness out of the ordinary  Neurological:  negative for  seizures and speech abnormal  Behavioral/Psych:  negative for  anxiety and depression out of the ordinary  Endocrine: negative for  diabetes:and weight loss, unintended  Allergies / Immunologic:  negative for  anaphylaxis and rash      Objective     Physical Exam:  General :    Alert, cooperative, in no acute distress   Head:    Normocephalic, without obvious abnormality, atraumatic   Eyes:            Lids and lashes normal, conjunctivae and sclerae normal, no   Icterus, conjunctival pallor   Throat:   No oral lesions, no thrush, oral mucosa moist, posterior oropharynx clear   Neck:   No adenopathy, supple, trachea midline, no thyromegaly, no   carotid bruit, no JVD   Lungs:     Clear to auscultation,respirations regular, even and                   unlabored    Heart:    Regular rhythm and normal rate, normal S1 and S2, no            murmur   Chest Wall:    No abnormalities observed   Abdomen:     Normal bowel sounds, no masses, no organomegaly, soft        non-tender, non-distended, no guarding, no rebound                 tenderness   Rectal:     Deferred   Extremities:   no edema, no cyanosis   Skin:   No open lesions, bruising or rash   Lymph nodes:   No palpable cervical adenopathy   Psychiatric:  Judgement and insight: normal   Orientation to person place and time: normal   Mood and affect: normal        Results Review:    I have reviewed all of the patients current test results  Results from last 7 days   Lab Units 09/10/19  0706 09/09/19  1916   WBC 10*3/mm3 8.26 8.93   HEMOGLOBIN g/dL  7.6* 5.9*   HEMATOCRIT % 24.7* 20.2*   PLATELETS 10*3/mm3 357 357       Results from last 7 days   Lab Units 09/10/19  0706 09/09/19  1916   SODIUM mmol/L 141 139   POTASSIUM mmol/L 4.2 3.8   CHLORIDE mmol/L 104 103   CO2 mmol/L 28.0 31.0   BUN mg/dL 10 17   CREATININE mg/dL 0.53* 0.67   CALCIUM mg/dL 8.5* 9.0   BILIRUBIN mg/dL  --  0.5   ALK PHOS U/L  --  71   ALT (SGPT) U/L  --  21   AST (SGOT) U/L  --  33   GLUCOSE mg/dL 78 91             No results found for: LIPASE    Radiology:    Imaging Results (last 24 hours)     ** No results found for the last 24 hours. **            Assessment/Plan     Patient Active Problem List   Diagnosis Code   • GI bleed K92.2   • Symptomatic anemia D64.9   • Acute on chronic blood loss anemia (CMS/Formerly [facility]) D60.0   • Morbid obesity (CMS/Formerly [facility]) E66.01   • Lupus L93.0   • COPD (chronic obstructive pulmonary disease) (CMS/Formerly [facility]) J44.9       Impression/Plan    Anemia, acute on chronic  Heme positive stool-outlying facility  GERD    She has received 2 units of packed red blood cells since admission, hemoglobin at 7 AM this morning 7.6.  Fecal occult blood test has been ordered  but has not been collected.  Plan on endoscopy evaluation today with Dr. Cope.  Risk benefits indications discussed with patient she was agreeable to proceed.  Continue PPI drip until endoscopy evaluation.  Further orders pending findings on endoscopy.    ROSA Pan 10:00 AM 09/10/19        Ilya Cope DO  09/10/19  10:08 AM        Electronically signed by Ilya Cope DO at 9/10/2019 10:08 AM

## 2019-09-10 NOTE — ANESTHESIA POSTPROCEDURE EVALUATION
Patient: Dominick Robin    Procedure Summary     Date:  09/10/19 Room / Location:  [facility] ENDOSCOPY 6 / [facility] END[facility]    Anesthesia Start:  1235 Anesthesia Stop:  1259    Procedure:  ESOPHAGOGASTRODUODENOSCOPY WITH ANESTHESIA (N/A ) Diagnosis:       Symptomatic anemia      (Symptomatic anemia [D64.9])    Surgeon:  Ilya Cope DO Provider:  Momo Hunter CRNA    Anesthesia Type:  MAC ASA Status:  3          Anesthesia Type: MAC  Last vitals  BP   145/77 (09/10/19 0858)   Temp   97.8 °F (36.6 °C) (09/10/19 0858)   Pulse   70 (09/10/19 0858)   Resp   18 (09/10/19 0858)     SpO2   100 % (09/10/19 0858)     Post Anesthesia Care and Evaluation    Patient location during evaluation: PHASE II  Patient participation: complete - patient participated  Level of consciousness: sleepy but conscious  Pain score: 0  Pain management: adequate  Airway patency: patent  Anesthetic complications: No anesthetic complications  PONV Status: none  Cardiovascular status: acceptable  Respiratory status: acceptable and nasal cannula  Hydration status: acceptable

## 2019-09-10 NOTE — TELEPHONE ENCOUNTER
[facility]  [id number]. DR Italia COKER. ROOM 387, CALL [phone].

## 2019-09-10 NOTE — PLAN OF CARE
Problem: Patient Care Overview  Goal: Plan of Care Review  Outcome: Ongoing (interventions implemented as appropriate)  No c/o pain or nausea today. Right port & left hand IID, PRotonix gtt d/cd    09/10/19 1633   Coping/Psychosocial   Plan of Care Reviewed With patient   Plan of Care Review   Progress no change    voiding, diet advanced to regular. 2u PRBC complete this morning. Ordered repeat H&H. 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 No active bleeding noted. Need OB stool. Continue to monitor   Goal: Individualization and Mutuality  Outcome: Ongoing (interventions implemented as appropriate)    Goal: Discharge Needs Assessment  Outcome: Ongoing (interventions implemented as appropriate)   09/10/19 1633   Discharge Needs Assessment   Readmission Within the Last 30 Days no previous admission in last 30 days   Concerns to be Addressed no discharge needs identified   Patient/Family Anticipates Transition to home   Patient/Family Anticipated Services at Transition none   Transportation Concerns car, none   Transportation Anticipated family or friend will provide   Equipment Needed After Discharge none   Disability   Equipment Currently Used at Home cane, straight;walker, rolling;wheelchair, motorized     Goal: Interprofessional Rounds/Family Conf  Outcome: Ongoing (interventions implemented as appropriate)   09/10/19 1633   Interdisciplinary Rounds/Family Conf   Participants ;nursing;patient;physician       Problem: Pain, Chronic (Adult)  Goal: Identify Related Risk Factors and Signs and Symptoms  Outcome: Ongoing (interventions implemented as appropriate)    Goal: Acceptable Pain/Comfort Level and Functional Ability  Outcome: Ongoing (interventions implemented as appropriate)   09/10/19 1633   Pain, Chronic (Adult)   Acceptable Pain/Comfort Level and Functional Ability making progress toward outcome       Problem: Anemia (Adult)  Goal: Identify Related Risk Factors and Signs and  Symptoms  Outcome: Ongoing (interventions implemented as appropriate)   09/10/19 1633   Anemia (Adult)   Signs and Symptoms (Anemia) fatigue     Goal: Symptom Improvement  Outcome: Ongoing (interventions implemented as appropriate)   09/10/19 1633   Anemia (Adult)   Symptom Improvement making progress toward outcome

## 2019-09-10 NOTE — ANESTHESIA PREPROCEDURE EVALUATION
Anesthesia Evaluation     Patient summary reviewed and Nursing notes reviewed   no history of anesthetic complications:  NPO Solid Status: > 8 hours  NPO Liquid Status: > 8 hours           Airway   Mallampati: III  TM distance: >3 FB  Neck ROM: full  No difficulty expected  Dental      Pulmonary    (+) COPD,   Cardiovascular   Exercise tolerance: poor (<4 METS)    (+) hypertension, hyperlipidemia,       Neuro/Psych  (+) TIA, headaches, psychiatric history Anxiety,     GI/Hepatic/Renal/Endo    (+) morbid obesity, hiatal hernia, GI bleeding, hypothyroidism,     ROS Comment: Admitted with symptomatic anemia, hgb 5.9 on admission. Today it is 7.6    Musculoskeletal         ROS comment: lupus  Abdominal    Substance History      OB/GYN          Other                        Anesthesia Plan    ASA 3     MAC     intravenous induction   Anesthetic plan, all risks, benefits, and alternatives have been provided, discussed and informed consent has been obtained with: patient.

## 2019-09-10 NOTE — PLAN OF CARE
Problem: Patient Care Overview  Goal: Plan of Care Review  Outcome: Ongoing (interventions implemented as appropriate)  protonix drip cont. Right port reaccessed per ccu nurse. First unit of blood infusing.  Denies pain so far. Npo. No active bleeding noted. Voids  Without difficulty. Cont to monitor.   09/10/19 0145   Coping/Psychosocial   Plan of Care Reviewed With patient   Plan of Care Review   Progress no change     Goal: Individualization and Mutuality  Outcome: Ongoing (interventions implemented as appropriate)    Goal: Discharge Needs Assessment  Outcome: Ongoing (interventions implemented as appropriate)      Problem: Pain, Chronic (Adult)  Goal: Acceptable Pain/Comfort Level and Functional Ability  Outcome: Ongoing (interventions implemented as appropriate)      Problem: Anemia (Adult)  Goal: Symptom Improvement  Outcome: Ongoing (interventions implemented as appropriate)

## 2019-09-11 LAB
ANION GAP SERPL CALCULATED.3IONS-SCNC: 9 MMOL/L (ref 5–15)
BASOPHILS # BLD AUTO: 0.04 10*3/MM3 (ref 0–0.2)
BASOPHILS NFR BLD AUTO: 0.5 % (ref 0–1.5)
BUN BLD-MCNC: 9 MG/DL (ref 6–20)
BUN/CREAT SERPL: 17.3 (ref 7–25)
CALCIUM SPEC-SCNC: 9.1 MG/DL (ref 8.6–10.5)
CHLORIDE SERPL-SCNC: 102 MMOL/L (ref 98–107)
CO2 SERPL-SCNC: 29 MMOL/L (ref 22–29)
CREAT BLD-MCNC: 0.52 MG/DL (ref 0.57–1)
CRP SERPL-MCNC: 0.32 MG/DL (ref 0–0.5)
DEPRECATED RDW RBC AUTO: 45.6 FL (ref 37–54)
EOSINOPHIL # BLD AUTO: 0.08 10*3/MM3 (ref 0–0.4)
EOSINOPHIL NFR BLD AUTO: 1 % (ref 0.3–6.2)
ERYTHROCYTE [DISTWIDTH] IN BLOOD BY AUTOMATED COUNT: 15.4 % (ref 12.3–15.4)
ERYTHROCYTE [SEDIMENTATION RATE] IN BLOOD: 21 MM/HR (ref 0–20)
FERRITIN SERPL-MCNC: 23.93 NG/ML (ref 13–150)
FOLATE SERPL-MCNC: 13.3 NG/ML (ref 4.78–24.2)
GFR SERPL CREATININE-BSD FRML MDRD: 147 ML/MIN/1.73
GLUCOSE BLD-MCNC: 85 MG/DL (ref 65–99)
GLUCOSE BLDC GLUCOMTR-MCNC: 70 MG/DL (ref 70–130)
GLUCOSE BLDC GLUCOMTR-MCNC: 85 MG/DL (ref 70–130)
GLUCOSE BLDC GLUCOMTR-MCNC: 85 MG/DL (ref 70–130)
HAPTOGLOB SERPL-MCNC: 161 MG/DL (ref 30–200)
HCT VFR BLD AUTO: 28.3 % (ref 34–46.6)
HGB BLD-MCNC: 8.7 G/DL (ref 12–15.9)
IMM GRANULOCYTES # BLD AUTO: 0.06 10*3/MM3 (ref 0–0.05)
IMM GRANULOCYTES NFR BLD AUTO: 0.7 % (ref 0–0.5)
LDH SERPL-CCNC: 295 U/L (ref 135–214)
LYMPHOCYTES # BLD AUTO: 0.8 10*3/MM3 (ref 0.7–3.1)
LYMPHOCYTES NFR BLD AUTO: 9.8 % (ref 19.6–45.3)
MCH RBC QN AUTO: 25.4 PG (ref 26.6–33)
MCHC RBC AUTO-ENTMCNC: 30.7 G/DL (ref 31.5–35.7)
MCV RBC AUTO: 82.5 FL (ref 79–97)
MONOCYTES # BLD AUTO: 0.7 10*3/MM3 (ref 0.1–0.9)
MONOCYTES NFR BLD AUTO: 8.5 % (ref 5–12)
NEUTROPHILS # BLD AUTO: 6.51 10*3/MM3 (ref 1.7–7)
NEUTROPHILS NFR BLD AUTO: 79.5 % (ref 42.7–76)
NRBC BLD AUTO-RTO: 0 /100 WBC (ref 0–0.2)
PLATELET # BLD AUTO: 352 10*3/MM3 (ref 140–450)
PMV BLD AUTO: 10.7 FL (ref 6–12)
POTASSIUM BLD-SCNC: 3.8 MMOL/L (ref 3.5–5.2)
RBC # BLD AUTO: 3.43 10*6/MM3 (ref 3.77–5.28)
RETICS # AUTO: 0.12 10*6/MM3 (ref 0.02–0.13)
RETICS/RBC NFR AUTO: 3.44 % (ref 0.7–1.9)
SODIUM BLD-SCNC: 140 MMOL/L (ref 136–145)
VIT B12 BLD-MCNC: 919 PG/ML (ref 211–946)
WBC NRBC COR # BLD: 8.19 10*3/MM3 (ref 3.4–10.8)

## 2019-09-11 PROCEDURE — 85025 COMPLETE CBC W/AUTO DIFF WBC: CPT | Performed by: NURSE PRACTITIONER

## 2019-09-11 PROCEDURE — 83615 LACTATE (LD) (LDH) ENZYME: CPT | Performed by: INTERNAL MEDICINE

## 2019-09-11 PROCEDURE — 82728 ASSAY OF FERRITIN: CPT | Performed by: INTERNAL MEDICINE

## 2019-09-11 PROCEDURE — 82746 ASSAY OF FOLIC ACID SERUM: CPT | Performed by: INTERNAL MEDICINE

## 2019-09-11 PROCEDURE — 82962 GLUCOSE BLOOD TEST: CPT

## 2019-09-11 PROCEDURE — 80048 BASIC METABOLIC PNL TOTAL CA: CPT | Performed by: NURSE PRACTITIONER

## 2019-09-11 PROCEDURE — 86140 C-REACTIVE PROTEIN: CPT | Performed by: NURSE PRACTITIONER

## 2019-09-11 PROCEDURE — 82607 VITAMIN B-12: CPT | Performed by: INTERNAL MEDICINE

## 2019-09-11 PROCEDURE — 85045 AUTOMATED RETICULOCYTE COUNT: CPT | Performed by: INTERNAL MEDICINE

## 2019-09-11 PROCEDURE — 25010000002 IRON SUCROSE PER 1 MG: Performed by: NURSE PRACTITIONER

## 2019-09-11 PROCEDURE — 85651 RBC SED RATE NONAUTOMATED: CPT | Performed by: NURSE PRACTITIONER

## 2019-09-11 PROCEDURE — 99232 SBSQ HOSP IP/OBS MODERATE 35: CPT | Performed by: NURSE PRACTITIONER

## 2019-09-11 PROCEDURE — 94799 UNLISTED PULMONARY SVC/PX: CPT

## 2019-09-11 PROCEDURE — 94760 N-INVAS EAR/PLS OXIMETRY 1: CPT

## 2019-09-11 RX ORDER — FERROUS SULFATE 325(65) MG
325 TABLET ORAL
Qty: 30 TABLET | Refills: 0 | Status: SHIPPED | OUTPATIENT
Start: 2019-09-11

## 2019-09-11 RX ADMIN — SODIUM CHLORIDE, PRESERVATIVE FREE 10 ML: 5 INJECTION INTRAVENOUS at 20:49

## 2019-09-11 RX ADMIN — LEVOTHYROXINE SODIUM 75 MCG: 75 TABLET ORAL at 06:55

## 2019-09-11 RX ADMIN — IRON SUCROSE 200 MG: 20 INJECTION, SOLUTION INTRAVENOUS at 13:21

## 2019-09-11 RX ADMIN — PANTOPRAZOLE SODIUM 40 MG: 40 TABLET, DELAYED RELEASE ORAL at 08:39

## 2019-09-11 RX ADMIN — HYDROCHLOROTHIAZIDE 25 MG: 25 TABLET ORAL at 08:39

## 2019-09-11 RX ADMIN — TRAMADOL HYDROCHLORIDE 50 MG: 50 TABLET, FILM COATED ORAL at 16:16

## 2019-09-11 RX ADMIN — ATORVASTATIN CALCIUM 10 MG: 10 TABLET, FILM COATED ORAL at 20:49

## 2019-09-11 RX ADMIN — TRAMADOL HYDROCHLORIDE 50 MG: 50 TABLET, FILM COATED ORAL at 08:40

## 2019-09-11 RX ADMIN — ISOSORBIDE MONONITRATE 60 MG: 60 TABLET, EXTENDED RELEASE ORAL at 08:39

## 2019-09-11 RX ADMIN — SODIUM CHLORIDE, PRESERVATIVE FREE 10 ML: 5 INJECTION INTRAVENOUS at 08:40

## 2019-09-11 NOTE — PLAN OF CARE
Problem: Patient Care Overview  Goal: Plan of Care Review  Outcome: Ongoing (interventions implemented as appropriate)    Goal: Individualization and Mutuality  Outcome: Ongoing (interventions implemented as appropriate)    Goal: Discharge Needs Assessment  Outcome: Ongoing (interventions implemented as appropriate)    Goal: Interprofessional Rounds/Family Conf  Outcome: Ongoing (interventions implemented as appropriate)      Problem: Pain, Chronic (Adult)  Goal: Identify Related Risk Factors and Signs and Symptoms  Outcome: Ongoing (interventions implemented as appropriate)    Goal: Acceptable Pain/Comfort Level and Functional Ability  Outcome: Ongoing (interventions implemented as appropriate)      Problem: Anemia (Adult)  Goal: Identify Related Risk Factors and Signs and Symptoms  Outcome: Ongoing (interventions implemented as appropriate)    Goal: Symptom Improvement  Outcome: Ongoing (interventions implemented as appropriate)

## 2019-09-11 NOTE — CONSULTS
MEDICAL ONCOLOGY CONSULTATION    Pt Name: Dominick Robin  MRN: [id number]  [id number]  YOB: 1960  Date of evaluation: 9/10/2019  Room:    Reason for Consultation: severe anemia  Requesting Physician:  Dr Christian    History Obtained From:  PATIENT and chart review    HISTORY OF PRESENT ILLNESS:      The patient is a 58 years old morbidly obese  Male who has several comorbidities to include a history of  hypertension, hypothyroidism who presents today ER with increased fatigue and reports of black colored stools. The patient does have a prior history of anemia and has received IV and oral iron therapy in the past.  He was seen by his PCP and was found to have hemoglobin 6.3, and therefore was referred to the emergency.  Apparently he had a fecal occult blood test positive at outside facility.  He has been seen by the GI group, Dr. Larios in the past.  He was started for further care.  Hemoglobin at admission of 5.9/MCV 82, RDW 15.9 with normal WBC and normal platelet counts.he patient was transfused 2 units PRBCs.  He was also seen by GI with considerations for an upper endoscopy.  He was started on PPIs.      Past Medical History:    Past Medical History:   Diagnosis Date   • Anemia    • Anxiety    • Bilateral calcaneal spurs    • COPD (chronic obstructive pulmonary disease) (CMS/HCC)    • Disease of thyroid gland    • Diverticulitis    • Dry eyes    • Fibromyalgia    • Headache    • Hiatal hernia    • History of GI bleed    • Hyperlipidemia    • Hypertension    • Lupus    • Sinusitis        Past Surgical History:    Past Surgical History:   Procedure Laterality Date   • ABDOMINAL SURGERY     • LEG SURGERY         Immunizations:      There is no immunization history on file for this patient.    Current Hospital Medications:      Current Facility-Administered Medications:   •  acetaminophen (TYLENOL) tablet 650 mg, 650 mg, Oral, Q4H PRN **OR** acetaminophen (TYLENOL) 160 MG/5ML solution 650 mg, 650 mg,  Oral, Q4H PRN **OR** acetaminophen (TYLENOL) suppository 650 mg, 650 mg, Rectal, Q4H PRN, Kerwin Christian MD  •  dextrose (D50W) 25 g/ 50mL Intravenous Solution 25 g, 25 g, Intravenous, Q15 Min PRN, Kerwin Christian MD  •  dextrose (GLUTOSE) oral gel 15 g, 15 g, Oral, Q15 Min PRN, Kerwin Christian MD  •  glucagon (human recombinant) (GLUCAGEN DIAGNOSTIC) injection 1 mg, 1 mg, Subcutaneous, Q15 Min PRN, Kerwin Christian MD  •  insulin lispro (humaLOG) injection 0-9 Units, 0-9 Units, Subcutaneous, 4x Daily With Meals & Nightly, Kerwin Christian MD  •  ipratropium-albuterol (DUO-NEB) nebulizer solution 3 mL, 3 mL, Nebulization, Q4H PRN, Kerwin Christian MD  •  ondansetron (ZOFRAN) tablet 4 mg, 4 mg, Oral, Q6H PRN **OR** ondansetron (ZOFRAN) injection 4 mg, 4 mg, Intravenous, Q6H PRN, Kerwin Christian MD  •  sodium chloride 0.9 % flush 10 mL, 10 mL, Intravenous, Q12H, Kerwin Christian MD  •  sodium chloride 0.9 % flush 10 mL, 10 mL, Intravenous, PRN, Kerwin Christian MD  •  sodium chloride 0.9 % infusion 500 mL, 500 mL, Intravenous, Continuous PRN, Ariane Montana CRNA, Last Rate: 25 mL/hr at 09/10/19 1235, 500 mL at 09/10/19 1235    Allergies:   Allergies   Allergen Reactions   • Motrin [Ibuprofen] Other (See Comments)     Unknown other than sleepiness   • Prednisone Other (See Comments)     unknown       Social History:    Social History     Socioeconomic History   • Marital status: Single     Spouse name: Not on file   • Number of children: Not on file   • Years of education: Not on file   • Highest education level: Not on file   Tobacco Use   • Smoking status: Never Smoker   • Smokeless tobacco: Never Used   Substance and Sexual Activity   • Sexual activity: Defer       Family History:   History reviewed. No pertinent family history.  No family history of anemia.  REVIEW OF SYSTEMS  CONSTITUTIONAL: no fever, no night sweats,  Fatigue;generalized weakness  HEENT: no blurring of  "vision, no double vision, no hearing difficulty, no tinnitus, no ulceration, no dental caries, and no dysplasia, no epistaxis;  LUNGS: no cough, no hemoptysis, no wheeze, no shortness of breath;  CARDIOVASCULAR: no palpitation, chest pain, no shortness of breath;  GI: no abdominal pain, no nausea, no vomiting, no diarrhea, no constipation;black stools  TILA: no dysuria, no hematuria, no frequency or urgency, no nephrolithiasis;  MUSCULOSKELETAL: no joint pain, no swelling, no stiffness;  ENDOCRINE: no polyuria, no polydipsia, no cold or heat intolerance;  HEMATOLOGY: no easy bruising or bleeding, no history of clotting disorder;  DERMATOLOGY: no skin rash, no eczema, no pruritus;  PSYCHIATRY: no depression, no anxiety, no panic attacks, no suicidal ideation, no homicidal ideation;  NEUROLOGY: no syncope, no seizures, no numbness or tingling of hands, no numbness or tingling of feet, no paresis;    Vitals:    /66 (BP Location: Right arm, Patient Position: Sitting)   Pulse 62   Temp 98.1 °F (36.7 °C)   Resp 16   Ht 160 cm (63\")   Wt 113 kg (249 lb 3.2 oz)   SpO2 100%   BMI 44.14 kg/m²     PHYSICAL EXAM:   CONSTITUTIONAL: Alert, appropriate, no acute distress, fatigued, morbidly obese  EYES: Non icteric, EOM intact, pupils equal round   ENT: Mucus membranes moist, no oral pharyngeal lesions, external inspection of ears and nose are normal  NECK: Supple, no masses.  No palpable thyroid mass  CHEST/LUNGS: CTA bilaterally, normal respiratory effort   CARDIOVASCULAR: RRR, no murmurs.  No lower extremity edema  ABDOMEN: soft non-tender, active bowel sounds, no HSM.  No palpable masses  EXTREMITIES: warm, full ROM in all 4 extremities, no focal weakness.  SKIN: warm, dry with no rashes or lesions  LYMPH: No cervical, clavicular, axillary, or inguinal lymphadenopathy  NEUROLOGIC: follows commands, non focal   PSYCH: mood and affect appropriate.  Alert and oriented to time, place, person    CBC  Results from last 7 " days   Lab Units 09/10/19  1640 09/10/19  0706 09/09/19  1916   WBC 10*3/mm3  --  8.26 8.93   HEMOGLOBIN g/dL 9.1* 7.6* 5.9*   HEMATOCRIT % 29.3* 24.7* 20.2*   PLATELETS 10*3/mm3  --  357 357         Lab Results   Component Value Date     09/10/2019    K 4.2 09/10/2019     09/10/2019    CO2 28.0 09/10/2019    BUN 10 09/10/2019    CREATININE 0.53 (L) 09/10/2019    GLUCOSE 78 09/10/2019    CALCIUM 8.5 (L) 09/10/2019    BILITOT 0.5 09/09/2019    ALKPHOS 71 09/09/2019    AST 33 09/09/2019    ALT 21 09/09/2019    AGRATIO 1.2 09/09/2019    GLOB 3.3 09/09/2019     ASSESSMENT/PLAN:  Severe anemia-status post blood transfusion.  Apparently the patient underwent an EGD today.  Results not available. Unfortunately, no anemia workup labs was ordered prior to blood transfusion.    Plan:  Follow-up results of CBC  Obtain medical records  We'll arrange for follow-up as outpatient  Continue IV iron therapy  Hemolytic panel to rule out a hemolytic process  We will check ferritin, B12 and folate and reticulocyte count, LDH      09/10/19  7:27 PM

## 2019-09-11 NOTE — PROGRESS NOTES
[facility]  Inpatient Progress Note    Reason for Follow Up: Anemia    Subjective     Subjective:   Patient lying in bed, she is without any complaints this morning.  No nausea no vomiting.  No signs of bright red blood per rectum or melena.  Endoscopy yesterday revealed gastric mass.    Current Facility-Administered Medications:   •  acetaminophen (TYLENOL) tablet 650 mg, 650 mg, Oral, Q4H PRN **OR** acetaminophen (TYLENOL) 160 MG/5ML solution 650 mg, 650 mg, Oral, Q4H PRN **OR** acetaminophen (TYLENOL) suppository 650 mg, 650 mg, Rectal, Q4H PRN, Kerwin Christian MD  •  atorvastatin (LIPITOR) tablet 10 mg, 10 mg, Oral, Nightly, Meli Salazar DO, 10 mg at 09/10/19 2249  •  dextrose (D50W) 25 g/ 50mL Intravenous Solution 25 g, 25 g, Intravenous, Q15 Min PRN, Kerwin Christian MD  •  dextrose (GLUTOSE) oral gel 15 g, 15 g, Oral, Q15 Min PRN, Kerwin Christian MD  •  glucagon (human recombinant) (GLUCAGEN DIAGNOSTIC) injection 1 mg, 1 mg, Subcutaneous, Q15 Min PRN, Kerwin Christian MD  •  hydrochlorothiazide (HYDRODIURIL) tablet 25 mg, 25 mg, Oral, Daily, Meli Salazar DO, 25 mg at 09/11/19 0839  •  insulin lispro (humaLOG) injection 0-9 Units, 0-9 Units, Subcutaneous, 4x Daily With Meals & Nightly, Kerwin Christian MD  •  ipratropium-albuterol (DUO-NEB) nebulizer solution 3 mL, 3 mL, Nebulization, Q4H PRN, Kerwin Christian MD  •  iron sucrose (VENOFER) 200 mg in sodium chloride 0.9 % 100 mL IVPB, 200 mg, Intravenous, Q24H, Black Briceno APRN  •  isosorbide mononitrate (IMDUR) 24 hr tablet 60 mg, 60 mg, Oral, Daily, Meli Salazar DO, 60 mg at 09/11/19 0839  •  levothyroxine (SYNTHROID, LEVOTHROID) tablet 75 mcg, 75 mcg, Oral, Q AM, Meli Salazar DO, 75 mcg at 09/11/19 0655  •  methocarbamol (ROBAXIN) tablet 500 mg, 500 mg, Oral, BID PRN, Meli Salazar DO  •  ondansetron (ZOFRAN) tablet 4 mg, 4 mg, Oral, Q6H PRN **OR** ondansetron (ZOFRAN)  injection 4 mg, 4 mg, Intravenous, Q6H PRN, Kerwin Christian MD  •  pantoprazole (PROTONIX) EC tablet 40 mg, 40 mg, Oral, Daily, Meli Salazar DO, 40 mg at 09/11/19 0839  •  sodium chloride 0.9 % flush 10 mL, 10 mL, Intravenous, Q12H, Kerwin Christian MD, 10 mL at 09/11/19 0840  •  sodium chloride 0.9 % flush 10 mL, 10 mL, Intravenous, PRN, Kerwin Christian MD  •  sodium chloride 0.9 % infusion 500 mL, 500 mL, Intravenous, Continuous PRN, Ariane Montana CRNA, Last Rate: 25 mL/hr at 09/10/19 1235, 500 mL at 09/10/19 1235  •  traMADol (ULTRAM) tablet 50 mg, 50 mg, Oral, TID PRN, Meli Salazar DO, 50 mg at 09/11/19 0840    Review of Systems     Constitution:  negative for chills, fatigue and fevers  Eyes:  negativefor blurriness and change of vision  ENT:   negative for sore throat and voice change  Respiratory: negative for  cough and shortness of air  Cardiovascular:  Negative for chest pain or palpitations  Gastrointestinal:  negative for  See HPI  Genitourinary:  negativefor  blood in urine and painful urination  Integument: negative for  rash and redness  Hematologic / Lymphatic: negative for  excessive bleeding and easy bruising  Musculoskeletal: negative for  joint pain and joint stiffness out of the ordinary  Neurological:  negative for  seizures and speech abnormal  Behavioral/Psych:  negative for  anxiety and depression out of the ordinary  Endocrine: negative for  diabetes:and weight loss, unintended  Allergies / Immunologic:  negative for  anaphylaxis and rash        Objective     Vital Signs  Temp:  [97.4 °F (36.3 °C)-98.2 °F (36.8 °C)] 98.2 °F (36.8 °C)  Heart Rate:  [52-70] 62  Resp:  [12-24] 17  BP: (103-175)/() 130/62  Body mass index is 44.14 kg/m².    Intake/Output Summary (Last 24 hours) at 9/11/2019 1246  Last data filed at 9/11/2019 0600  Gross per 24 hour   Intake --   Output 650 ml   Net -650 ml     No intake/output data recorded.       Physical  Exam:   General: patient awake, alert and cooperative   Eyes: Normal lids and lashes, no scleral icterus   Neck: supple, normal ROM   Skin: warm and dry, not jaundiced   Cardiovascular: regular rhythm and rate, no murmurs auscultated   Pulm: clear to auscultation bilaterally, regular and unlabored   Abdomen: soft, nontender, nondistended; normal bowel sounds   Rectal: deferred   Extremities: no rash or edema   Psychiatric: Normal mood and behavior; memory intact         Results Review:    I have reviewed all of the patients current test results  Results from last 7 days   Lab Units 09/11/19  0916 09/10/19  1640 09/10/19  0706 09/09/19  1916   WBC 10*3/mm3 8.19  --  8.26 8.93   HEMOGLOBIN g/dL 8.7* 9.1* 7.6* 5.9*   HEMATOCRIT % 28.3* 29.3* 24.7* 20.2*   PLATELETS 10*3/mm3 352  --  357 357       Results from last 7 days   Lab Units 09/11/19  1135 09/10/19  0706 09/09/19  1916   SODIUM mmol/L 140 141 139   POTASSIUM mmol/L 3.8 4.2 3.8   CHLORIDE mmol/L 102 104 103   CO2 mmol/L 29.0 28.0 31.0   BUN mg/dL 9 10 17   CREATININE mg/dL 0.52* 0.53* 0.67   CALCIUM mg/dL 9.1 8.5* 9.0   BILIRUBIN mg/dL  --   --  0.5   ALK PHOS U/L  --   --  71   ALT (SGPT) U/L  --   --  21   AST (SGOT) U/L  --   --  33   GLUCOSE mg/dL 85 78 91             No results found for: LIPASE    Radiology:    Imaging Results (last 24 hours)     ** No results found for the last 24 hours. **            Assessment/Plan     Patient Active Problem List   Diagnosis Code   • GI bleed K92.2   • Symptomatic anemia D64.9   • Acute on chronic blood loss anemia (CMS/HCC) D60.0   • Morbid obesity (CMS/HCC) E66.01   • Lupus L93.0   • COPD (chronic obstructive pulmonary disease) (CMS/HCC) J44.9   • Gastric mass K31.9       Impression/Plan    Anemia, acute on chronic  GERD  Heme positive stool  Gastric tumor    Continue to trend H&H.  Transfuse as needed/indicated.  Diet has been advanced to regular.  Patient will need further evaluation of gastric mass by EUS.   [facility] at [facility] ([phone]) notified patient will need EUS.  No further orders from GI perspective, we will sign off at this time.    Jacob Roach, APRN 9:23 AM      Ilya Cope DO  09/11/19  12:46 PM

## 2019-09-11 NOTE — PROGRESS NOTES
HEMATOLOGY AND MEDICAL ONCOLOGY PROGRESS NOTE          Pt Name: Dominick Robin  YOB: 1960  MRN: [id number]    Date of evaluation: 9/11/2019  History Obtained From:  History obtained from chart review and the patient.    CHIEF COMPLAINT:  Weakness, melena    HISTORY OF PRESENT ILLNESS:    Dominick Robin is a 58 y.o. old morbidly obese female  who has several comorbidities to include a history of hypertension, hypothyroidism who presented to [facility] 9/9/19 with increased fatigue and reports of black colored stools. She does have a prior history of anemia and has received IV and oral iron therapy in the past.  Dominick was seen by her PCP and was found to have hemoglobin 6.3, and therefore was referred to the emergency.  Apparently she had a fecal occult blood test positive at outside facility.    She has been seen by the GI group, Dr. Larios in the past.    She was started for further care.  Hemoglobin at admission of 5.9/MCV 82, RDW 15.9 with normal WBC and normal platelet counts  The patient was transfused 2 units PRBCs.  She was also seen by GI, started on PPIs and underwent upper endoscopy 9/10/2019 by Dr. Ilya Cope.  Procedure note was reviewed and documented a small, submucosal, non-circumferential mass with no bleeding and no stigmata of recent bleeding found in the gastric antrum.  Recommendation for EGD/EUS of antral submucosal [facility].     Labs 9/9/19:  Iron 14, TIBC 508, Iron saturation 3%  TSH 3.05  CMP: creatinine 0.67,   Stool OB positive    Ferritin, Retic count, B12, Folate, LDH, CRP, Sed rate requested 9/10/19       Subjective   REVIEW OF SYSTEMS:   Review of Systems   Constitutional: Positive for fatigue. Negative for chills.        No night sweats       HENT: Negative for dental problem, hearing loss, mouth sores, nosebleeds, sore throat and trouble swallowing.    Eyes:        No blurring of vision, no double vision     Respiratory: Positive for shortness of  breath (exertional ). Negative for cough and wheezing.         No hemoptysis       Cardiovascular: Negative for chest pain, palpitations and leg swelling.   Gastrointestinal: Negative for abdominal pain, constipation, diarrhea, nausea and vomiting.   Endocrine: Negative for cold intolerance, heat intolerance, polydipsia and polyuria.   Genitourinary: Negative for dysuria, frequency, hematuria and urgency.   Musculoskeletal: Negative for arthralgias, joint swelling and myalgias.        Weak  H/o lupus     Skin: Negative for pallor and rash.   Allergic/Immunologic: Negative for immunocompromised state.   Neurological: Negative for seizures, syncope and numbness.   Hematological: Negative for adenopathy. Does not bruise/bleed easily.   Psychiatric/Behavioral: Negative for confusion and suicidal ideas.       Objective   PHYSICAL EXAM:  Physical Exam   Constitutional: She is oriented to person, place, and time. She appears well-developed and well-nourished. No distress.   Morbidly obese     HENT:   Head: Normocephalic and atraumatic.   Right Ear: External ear normal.   Left Ear: External ear normal.   Nose: Nose normal.   Mouth/Throat: Oropharynx is clear and moist.   Eyes: EOM are normal. No scleral icterus.   Neck: Neck supple. No tracheal deviation present.   Cardiovascular: Normal rate and regular rhythm.   Pulmonary/Chest: Effort normal and breath sounds normal. No respiratory distress. She has no wheezes. She has no rales.   Abdominal: Soft. Bowel sounds are normal. She exhibits no distension and no mass.   No hepatosplenomegaly     Genitourinary:   Genitourinary Comments: Exam deferred.     Musculoskeletal: She exhibits no edema or tenderness.   Normal ROM to all 4 extremities     Lymphadenopathy:   No bulky palpable cervical, clavicular, axillary or inguinal adenopathies on the left or right.     Neurological: She is alert and oriented to person, place, and time.   Follows commands. Non-focal.     Skin: Skin is  warm and dry. No rash noted.   Psychiatric: She has a normal mood and affect. Her behavior is normal. Thought content normal.     Labs:  CBC  Results from last 7 days   Lab Units 09/10/19  1640 09/10/19  0706 09/09/19  1916   WBC 10*3/mm3  --  8.26 8.93   HEMOGLOBIN g/dL 9.1* 7.6* 5.9*   HEMATOCRIT % 29.3* 24.7* 20.2*   PLATELETS 10*3/mm3  --  357 357       Lab Results   Component Value Date     09/10/2019    K 4.2 09/10/2019     09/10/2019    CO2 28.0 09/10/2019    BUN 10 09/10/2019    CREATININE 0.53 (L) 09/10/2019    GLUCOSE 78 09/10/2019    CALCIUM 8.5 (L) 09/10/2019    BILITOT 0.5 09/09/2019    ALKPHOS 71 09/09/2019    AST 33 09/09/2019    ALT 21 09/09/2019    AGRATIO 1.2 09/09/2019    GLOB 3.3 09/09/2019       No results found for: INR, PROTIME    Cultures:  No results found for: BLOODCX  No components found for: URINCX      ASSESSMENT/PLAN:        Symptomatic anemia    GI bleed    Acute on chronic blood loss anemia (CMS/HCC)    Morbid obesity (CMS/HCC)    Lupus    COPD (chronic obstructive pulmonary disease) (CMS/HCC)    Gastric mass    Severe anemia, multifactorial - acute blood loss, iron deficiency, chronic disease  Hgb 9.1 -status post blood transfusion  OB stool +  endoscopy 9/10/2019 by Dr. Cope: small, submucosal, non-circumferential mass with no bleeding and no stigmata of recent bleeding found in the gastric antrum.  recommended for EGD/EUS of antral submucosal [facility].   receiving PPI    Labs 9/9/19:  Iron 14, TIBC 508, Iron saturation 3%  TSH 3.05  CMP: creatinine 0.67,     ferritin, B12 and folate and reticulocyte count, LDH, CRP, Sed rate pending          Hallie Herrera, APRJODIE  09/11/19  7:00 AM    Physician's attestation/substantial contribution:  I, Dr Isac Treviño, independently performed an evaluation on Dominick Robin. I have reviewed relevant medical information/data to include but not limited to medication list, relevant appropriate labs and  imaging when applicable. I reviewed other physician's notes, ancillary services and nurses assessment. I have reviewed the above documentation completed by the Nurse Practitioner or Physician Assistant. Please see my additional contributions to the history of present illness, physical examination, and assessment/medical decision-making that reflect my findings and impressions. I discussed essential elements of the care plan with the NP or PA and the patient as well. I answered all the questions to the patient's satisfaction. I concur with above stated.   Subjective-still feels quite tired.  She denies any bleeding.  Objective-lungs are clear.abdomen is nontender.   Assessment/plan:  anemia-likely multifactorial.  Awaiting results of labs this morning.Of note, the patient also has a history of of lupus.  She does not have a rheumatologist.  Gastric mass-she will need a referral for an outpatient US/FNA biopsy

## 2019-09-11 NOTE — PLAN OF CARE
Problem: Patient Care Overview  Goal: Plan of Care Review  Outcome: Ongoing (interventions implemented as appropriate)  Tolerated reg diet. Po pain med x1 with effexctive relief. + bm- large/ hard dark brown marbles. Sent stool for ob. Up ad nani to bathroom. No distress noted. Cont to monitor.   09/11/19 0258   Coping/Psychosocial   Plan of Care Reviewed With patient   Plan of Care Review   Progress improving     Goal: Discharge Needs Assessment  Outcome: Ongoing (interventions implemented as appropriate)      Problem: Pain, Chronic (Adult)  Goal: Acceptable Pain/Comfort Level and Functional Ability  Outcome: Ongoing (interventions implemented as appropriate)      Problem: Anemia (Adult)  Goal: Symptom Improvement  Outcome: Ongoing (interventions implemented as appropriate)

## 2019-09-11 NOTE — PROGRESS NOTES
[facility]  INPATIENT PROGRESS NOTE    Patient Name: Dominick Robin  Date of Admission: 9/9/2019  Today's Date: 09/11/19  Length of Stay: 2  Primary Care Physician: Curtis Anderson MD    Subjective   Chief Complaint: chest pain     HPI   The patient is currently sitting up in bed.  She had a dark brown/black bowel movement overnight.  She states that she is not having any chest pain or shortness of breath today.  She denies any fever or chills.  No vomiting or hematemesis.  Advancing diet today.     Review of Systems   Constitutional: Positive for activity change and fatigue. Negative for appetite change, chills and fever.   HENT: Negative for hearing loss, nosebleeds, tinnitus and trouble swallowing.    Eyes: Negative for visual disturbance.   Respiratory: Negative for cough, chest tightness, shortness of breath and wheezing.    Cardiovascular: Positive for chest pain. Negative for palpitations and leg swelling.   Gastrointestinal: Positive for blood in stool. Negative for abdominal distention, abdominal pain, constipation, diarrhea, nausea and vomiting.   Endocrine: Negative for cold intolerance, heat intolerance, polydipsia, polyphagia and polyuria.   Genitourinary: Negative for decreased urine volume, difficulty urinating, dysuria, flank pain, frequency and hematuria.   Musculoskeletal: Negative for arthralgias, joint swelling and myalgias.   Skin: Negative for rash.   Allergic/Immunologic: Negative for immunocompromised state.   Neurological: Positive for weakness. Negative for dizziness, syncope, light-headedness and headaches.   Hematological: Negative for adenopathy. Does not bruise/bleed easily.   Psychiatric/Behavioral: Negative for confusion and sleep disturbance. The patient is not nervous/anxious.       All pertinent negatives and positives are as above. All other systems have been reviewed and are negative unless otherwise stated.     Objective     Temp:  [97.4 °F (36.3 °C)-98.2 °F (36.8 °C)] 98.2 °F (36.8 °C)  Heart Rate:  [52-70] 62  Resp:  [12-24] 17  BP: (103-175)/() 130/62     Intake/Output Summary (Last 24 hours) at 9/11/2019 1152  Last data filed at 9/11/2019 0600  Gross per 24 hour   Intake --   Output 650 ml   Net -650 ml     Physical Exam   Constitutional: She is oriented to person, place, and time. She appears well-developed and well-nourished.   Sitting up in bed.  NAD.  Morbidly Obese.    HENT:   Head: Normocephalic and atraumatic.   Eyes: Conjunctivae and EOM are normal. Pupils are equal, round, and reactive to light.   Neck: Neck supple. No JVD present. No thyromegaly present.   Cardiovascular: Normal rate, regular rhythm, normal heart sounds and intact distal pulses. Exam reveals no gallop and no friction rub.   No murmur heard.  Pulmonary/Chest: Effort normal and breath sounds normal. No respiratory distress. She has no wheezes. She has no rales. She exhibits no tenderness.   Abdominal: Soft. Bowel sounds are normal. She exhibits no distension. There is no tenderness. There is no rebound and no guarding.   Genitourinary:   Genitourinary Comments: Voiding.    Musculoskeletal: Normal range of motion. She exhibits no edema, tenderness or deformity.   Lymphadenopathy:     She has no cervical adenopathy.   Neurological: She is alert and oriented to person, place, and time.   Skin: Skin is warm and dry. No rash noted.   Psychiatric: She has a normal mood and affect. Her behavior is normal. Judgment and thought content normal.   Nursing note and vitals reviewed.    Results Review:  I have reviewed the labs, radiology results, and diagnostic studies.    Laboratory Data:   Results from last 7 days   Lab Units 09/11/19  0916 09/10/19  1640 09/10/19  0706 09/09/19  1916   WBC 10*3/mm3 8.19  --  8.26 8.93   HEMOGLOBIN g/dL 8.7* 9.1* 7.6* 5.9*   HEMATOCRIT % 28.3* 29.3* 24.7* 20.2*   PLATELETS 10*3/mm3 352  --  357 357     Results from last 7 days    Lab Units 09/10/19  0706 09/09/19  1916   SODIUM mmol/L 141 139   POTASSIUM mmol/L 4.2 3.8   CHLORIDE mmol/L 104 103   CO2 mmol/L 28.0 31.0   BUN mg/dL 10 17   CREATININE mg/dL 0.53* 0.67   CALCIUM mg/dL 8.5* 9.0   BILIRUBIN mg/dL  --  0.5   ALK PHOS U/L  --  71   ALT (SGPT) U/L  --  21   AST (SGOT) U/L  --  33   GLUCOSE mg/dL 78 91       Culture Data:        Radiology Data:   Imaging Results (last 24 hours)     ** No results found for the last 24 hours. **        I have reviewed the patient's current medications.     Assessment/Plan     Active Hospital Problems    Diagnosis   • **Symptomatic anemia   • Gastric mass   • GI bleed   • Acute on chronic blood loss anemia (CMS/HCC)   • Lupus   • Morbid obesity (CMS/HCC)   • COPD (chronic obstructive pulmonary disease) (CMS/HCC)     Plan:  1.  Upper endoscopy today that revealed a gastric mass that needs an EUS with biopsy as an outpatient   2.  Transfused 3 units of packed red blood cells, hemoglobin now 8.7  3.  Monitor for bleeding  4.  Hematology following   5.  Venofer   6.  CBC and BMP in AM  7.  [facility]  8.  Regular diet     Discharge Planning: I expect the patient to be discharged to home in 1 day.    ROSA Zuluaga   09/11/19   11:52 AM      I personally evaluated and examined the patient in conjunction with ROSA Jay and agree with the assessment, treatment plan, and disposition of the patient as recorded by her. My history, exam, and further recommendations are:     Patient seen and examined at bedside.  Patient needs EUS, assisting [facility] to help [facility].  No ride today, will go tomorrow.    Kerwin Christian MD  09/11/19  9:22 PM

## 2019-09-12 VITALS
DIASTOLIC BLOOD PRESSURE: 76 MMHG | HEART RATE: 64 BPM | RESPIRATION RATE: 20 BRPM | TEMPERATURE: 98 F | OXYGEN SATURATION: 94 % | SYSTOLIC BLOOD PRESSURE: 133 MMHG | HEIGHT: 63 IN | BODY MASS INDEX: 44.16 KG/M2 | WEIGHT: 249.2 LBS

## 2019-09-12 LAB
ANION GAP SERPL CALCULATED.3IONS-SCNC: 7 MMOL/L (ref 5–15)
BUN BLD-MCNC: 10 MG/DL (ref 6–20)
BUN/CREAT SERPL: 18.9 (ref 7–25)
CALCIUM SPEC-SCNC: 8.9 MG/DL (ref 8.6–10.5)
CHLORIDE SERPL-SCNC: 103 MMOL/L (ref 98–107)
CO2 SERPL-SCNC: 30 MMOL/L (ref 22–29)
CREAT BLD-MCNC: 0.53 MG/DL (ref 0.57–1)
DEPRECATED RDW RBC AUTO: 47.8 FL (ref 37–54)
ERYTHROCYTE [DISTWIDTH] IN BLOOD BY AUTOMATED COUNT: 16 % (ref 12.3–15.4)
GFR SERPL CREATININE-BSD FRML MDRD: 144 ML/MIN/1.73
GLUCOSE BLD-MCNC: 87 MG/DL (ref 65–99)
HCT VFR BLD AUTO: 28.5 % (ref 34–46.6)
HGB BLD-MCNC: 8.6 G/DL (ref 12–15.9)
MCH RBC QN AUTO: 25.4 PG (ref 26.6–33)
MCHC RBC AUTO-ENTMCNC: 30.2 G/DL (ref 31.5–35.7)
MCV RBC AUTO: 84.3 FL (ref 79–97)
PLATELET # BLD AUTO: 348 10*3/MM3 (ref 140–450)
PMV BLD AUTO: 10.7 FL (ref 6–12)
POTASSIUM BLD-SCNC: 4.1 MMOL/L (ref 3.5–5.2)
RBC # BLD AUTO: 3.38 10*6/MM3 (ref 3.77–5.28)
SODIUM BLD-SCNC: 140 MMOL/L (ref 136–145)
WBC NRBC COR # BLD: 7.49 10*3/MM3 (ref 3.4–10.8)

## 2019-09-12 PROCEDURE — 85027 COMPLETE CBC AUTOMATED: CPT | Performed by: NURSE PRACTITIONER

## 2019-09-12 PROCEDURE — 80048 BASIC METABOLIC PNL TOTAL CA: CPT | Performed by: NURSE PRACTITIONER

## 2019-09-12 PROCEDURE — 25010000002 IRON SUCROSE PER 1 MG: Performed by: INTERNAL MEDICINE

## 2019-09-12 RX ADMIN — PANTOPRAZOLE SODIUM 40 MG: 40 TABLET, DELAYED RELEASE ORAL at 08:30

## 2019-09-12 RX ADMIN — ISOSORBIDE MONONITRATE 60 MG: 60 TABLET, EXTENDED RELEASE ORAL at 08:30

## 2019-09-12 RX ADMIN — LEVOTHYROXINE SODIUM 75 MCG: 75 TABLET ORAL at 06:21

## 2019-09-12 RX ADMIN — TRAMADOL HYDROCHLORIDE 50 MG: 50 TABLET, FILM COATED ORAL at 06:41

## 2019-09-12 RX ADMIN — HYDROCHLOROTHIAZIDE 25 MG: 25 TABLET ORAL at 08:30

## 2019-09-12 RX ADMIN — IRON SUCROSE 400 MG: 20 INJECTION, SOLUTION INTRAVENOUS at 11:37

## 2019-09-12 NOTE — DISCHARGE SUMMARY
[facility] Medicine Services  DISCHARGE SUMMARY     Date of Admission: 9/9/2019  Date of Discharge:  9/12/2019  Primary Care Physician: Curtis Anderson MD    Presenting Problem/History of Present Illness:  GI bleed [K92.2]     Discharge Diagnoses:  Active Hospital Problems    Diagnosis   • **Symptomatic anemia   • Gastric mass   • GI bleed   • Acute on chronic blood loss anemia (CMS/HCC)   • Morbid obesity (CMS/HCC)   • Lupus   • COPD (chronic obstructive pulmonary disease) (CMS/HCC)     Chief Complaint on Day of Discharge: Feels better.  History of Present Illness on Day of Discharge:   Sitting up in bed.  No family in room.  Patient feels better.  Denies black stool last night.  Denies chest pain, palpitations or shortness of breath.  No oxygen in use.  No hematemesis or vomiting.  Tolerating regular diet.  Denies fever or chills.    Consults:   1.  Dr. Ilya Cope, gastroenterology  2.  Dr. Treivño, hematology     Procedures Performed:   Endoscopy 9/10/2019    Pertinent Test Results:   Laboratory Data:    Results from last 7 days   Lab Units 09/12/19  0616 09/11/19  0916 09/10/19  1640 09/10/19  0706   WBC 10*3/mm3 7.49 8.19  --  8.26   HEMOGLOBIN g/dL 8.6* 8.7* 9.1* 7.6*   HEMATOCRIT % 28.5* 28.3* 29.3* 24.7*   PLATELETS 10*3/mm3 348 352  --  357        Results from last 7 days   Lab Units 09/12/19  0616 09/11/19  1135 09/10/19  0706 09/09/19  1916   SODIUM mmol/L 140 140 141 139   POTASSIUM mmol/L 4.1 3.8 4.2 3.8   CHLORIDE mmol/L 103 102 104 103   CO2 mmol/L 30.0* 29.0 28.0 31.0   BUN mg/dL 10 9 10 17   CREATININE mg/dL 0.53* 0.52* 0.53* 0.67   CALCIUM mg/dL 8.9 9.1 8.5* 9.0   BILIRUBIN mg/dL  --   --   --  0.5   ALK PHOS U/L  --   --   --  71   ALT (SGPT) U/L  --   --   --  21   AST (SGOT) U/L  --   --   --  33   GLUCOSE mg/dL 87 85 78 91     Lab Results (all)     Procedure Component Value Units Date/Time    CBC (No Diff) [[id number]]  (Abnormal) Collected:   09/12/19 0616    Specimen:  Blood Updated:  09/12/19 0650     WBC 7.49 10*3/mm3      RBC 3.38 10*6/mm3      Hemoglobin 8.6 g/dL      Hematocrit 28.5 %      MCV 84.3 fL      MCH 25.4 pg      MCHC 30.2 g/dL      RDW 16.0 %      RDW-SD 47.8 fl      MPV 10.7 fL      Platelets 348 10*3/mm3     Basic Metabolic Panel [[id number]] Collected:  09/12/19 0616    Specimen:  Blood Updated:  09/12/19 0642    POC Glucose Once [[id number]]  (Normal) Collected:  09/11/19 1955    Specimen:  Blood Updated:  09/11/19 2006     Glucose 85 mg/dL      Comment: : [id number] [place] RaulitochadFarhanter ID: [id number]       Vitamin B12 [[id number]]  (Normal) Collected:  09/11/19 0916    Specimen:  Blood Updated:  09/11/19 1942     Vitamin B-12 919 pg/mL     Folate [[id number]]  (Normal) Collected:  09/11/19 0916    Specimen:  Blood Updated:  09/11/19 1942     Folate 13.30 ng/mL     POC Glucose Once [[id number]]  (Normal) Collected:  09/11/19 1716    Specimen:  Blood Updated:  09/11/19 1729     Glucose 85 mg/dL      Comment: : [id number] Marlene Mathews ID: [id number]       Haptoglobin [[id number]]  (Normal) Collected:  09/10/19 0703    Specimen:  Blood Updated:  09/11/19 1249     Haptoglobin 161 mg/dL     Basic Metabolic Panel [[id number]]  (Abnormal) Collected:  09/11/19 1135    Specimen:  Blood Updated:  09/11/19 1231     Glucose 85 mg/dL      BUN 9 mg/dL      Creatinine 0.52 mg/dL      Sodium 140 mmol/L      Potassium 3.8 mmol/L      Chloride 102 mmol/L      CO2 29.0 mmol/L      Calcium 9.1 mg/dL      eGFR  African Amer 147 mL/min/1.73      BUN/Creatinine Ratio 17.3     Anion Gap 9.0 mmol/L     Narrative:       GFR Normal >60  Chronic Kidney Disease <60  Kidney Failure <15    Ferritin [[id number]]  (Normal) Collected:  09/11/19 1135    Specimen:  Blood Updated:  09/11/19 1231     Ferritin 23.93 ng/mL     Lactate Dehydrogenase [[id number]]  (Abnormal) Collected:  09/11/19 1135    Specimen:  Blood Updated:  09/11/19 1231      U/L      C-reactive Protein [[id number]]  (Normal) Collected:  09/11/19 1135    Specimen:  Blood Updated:  09/11/19 1231     C-Reactive Protein 0.32 mg/dL     Sedimentation Rate [[id number]]  (Abnormal) Collected:  09/11/19 0916    Specimen:  Blood Updated:  09/11/19 0940     Sed Rate 21 mm/hr     CBC & Differential [[id number]] Collected:  09/11/19 0916    Specimen:  Blood Updated:  09/11/19 0932    Narrative:       The following orders were created for panel order CBC & Differential.  Procedure                               Abnormality         Status                     ---------                               -----------         ------                     CBC Auto Differential[[id number]]        Abnormal            Final result                 Please view results for these tests on the individual orders.    Reticulocytes [[id number]]  (Abnormal) Collected:  09/11/19 0916    Specimen:  Blood Updated:  09/11/19 0932     Reticulocyte % 3.44 %      Reticulocyte Absolute 0.1180 10*6/mm3     CBC Auto Differential [[id number]]  (Abnormal) Collected:  09/11/19 0916    Specimen:  Blood Updated:  09/11/19 0932     WBC 8.19 10*3/mm3      RBC 3.43 10*6/mm3      Hemoglobin 8.7 g/dL      Hematocrit 28.3 %      MCV 82.5 fL      MCH 25.4 pg      MCHC 30.7 g/dL      RDW 15.4 %      RDW-SD 45.6 fl      MPV 10.7 fL      Platelets 352 10*3/mm3      Neutrophil % 79.5 %      Lymphocyte % 9.8 %      Monocyte % 8.5 %      Eosinophil % 1.0 %      Basophil % 0.5 %      Immature Grans % 0.7 %      Neutrophils, Absolute 6.51 10*3/mm3      Lymphocytes, Absolute 0.80 10*3/mm3      Monocytes, Absolute 0.70 10*3/mm3      Eosinophils, Absolute 0.08 10*3/mm3      Basophils, Absolute 0.04 10*3/mm3      Immature Grans, Absolute 0.06 10*3/mm3      nRBC 0.0 /100 WBC     POC Glucose Once [[id number]]  (Normal) Collected:  09/11/19 0740    Specimen:  Blood Updated:  09/11/19 0810     Glucose 70 mg/dL      Comment: : [id number] Marlene Mathews ID: [id number]        Occult Blood X 1, Stool - Stool, Per Rectum [[id number]]  (Abnormal) Collected:  09/10/19 2326    Specimen:  Stool from Per Rectum Updated:  09/10/19 2335     Fecal Occult Blood Positive    POC Glucose Once [[id number]]  (Normal) Collected:  09/10/19 2125    Specimen:  Blood Updated:  09/10/19 2138     Glucose 101 mg/dL      Comment: : [id number] [place] SharonMeter ID: [id number]       POC Glucose Once [[id number]]  (Normal) Collected:  09/10/19 1809    Specimen:  Blood Updated:  09/10/19 1820     Glucose 79 mg/dL      Comment: : [id number] [place] ([place]oom[place]) MalloryMeter ID: [id number]       Hemoglobin & Hematocrit, Blood [[id number]]  (Abnormal) Collected:  09/10/19 1640    Specimen:  Blood Updated:  09/10/19 1651     Hemoglobin 9.1 g/dL      Hematocrit 29.3 %     POC Glucose Once [[id number]]  (Abnormal) Collected:  09/10/19 1124    Specimen:  Blood Updated:  09/10/19 1145     Glucose 69 mg/dL      Comment: : [id number] [place] (Coombs) MalloryMeter ID: [id number]       Urine Drug Screen - Urine, Clean Catch [[id number]]  (Normal) Collected:  09/10/19 1052    Specimen:  Urine, Clean Catch Updated:  09/10/19 1111     Amphetamine Screen, Urine Negative     Barbiturates Screen, Urine Negative     Benzodiazepine Screen, Urine Negative     Cocaine Screen, Urine Negative     Methadone Screen, Urine Negative     Opiate Screen Negative     Phencyclidine (PCP), Urine Negative     THC, Screen, Urine Negative    Narrative:       Negative Thresholds For Drugs Screened in Urine:    Amphetamines          500 ng/ml  Barbiturates          200 ng/ml  Benzodiazepines       200 ng/ml  Cocaine               150 ng/ml  Methadone             150 ng/ml  Opiates               300 ng/ml  Phencyclidine         25 ng/ml  THC                      50 ng/ml    The normal value for all drugs tested is negative. This report includes final unconfirmed screening results.  A positive result by this assay can be, at your request, sent to  the Reference Lab for confirmation by gas chromatography. Unconfirmed results must not be used for non-medical purposes, such as employment or legal testing. Clinical consideration should be applied to any drug of abuse test result, particularly when unconfirmed results are used.    Iron Profile [[id number]]  (Abnormal) Collected:  09/09/19 1916    Specimen:  Blood Updated:  09/10/19 1104     Iron 14 mcg/dL      Iron Saturation 3 %      Transferrin 341 mg/dL      TIBC 508 mcg/dL     POC Glucose Once [[id number]]  (Normal) Collected:  09/10/19 0821    Specimen:  Blood Updated:  09/10/19 0832     Glucose 80 mg/dL      Comment: : [id number] Jeanna (Ilya) TracyMeter ID: [id number]       Basic Metabolic Panel [[id number]]  (Abnormal) Collected:  09/10/19 0706    Specimen:  Blood Updated:  09/10/19 0823     Glucose 78 mg/dL      BUN 10 mg/dL      Creatinine 0.53 mg/dL      Sodium 141 mmol/L      Potassium 4.2 mmol/L      Chloride 104 mmol/L      CO2 28.0 mmol/L      Calcium 8.5 mg/dL      eGFR  African Amer 144 mL/min/1.73      BUN/Creatinine Ratio 18.9     Anion Gap 9.0 mmol/L     Narrative:       GFR Normal >60  Chronic Kidney Disease <60  Kidney Failure <15    CBC (No Diff) [[id number]]  (Abnormal) Collected:  09/10/19 0706    Specimen:  Blood Updated:  09/10/19 0808     WBC 8.26 10*3/mm3      RBC 3.02 10*6/mm3      Hemoglobin 7.6 g/dL      Hematocrit 24.7 %      MCV 81.8 fL      MCH 25.2 pg      MCHC 30.8 g/dL      RDW 15.3 %      RDW-SD 44.7 fl      MPV 11.4 fL      Platelets 357 10*3/mm3     Hemoglobin A1c [[id number]]  (Abnormal) Collected:  09/09/19 1916    Specimen:  Blood Updated:  09/10/19 0734     Hemoglobin A1C 4.6 %     Narrative:       Less than 6.0           Non-Diabetic Range  6.0-7.0                 ADA Therapeutic Target  Greater than 7.0        Action Suggested    TSH [[id number]]  (Normal) Collected:  09/09/19 1916    Specimen:  Blood Updated:  09/09/19 2011     TSH 3.050 uIU/mL     Troponin  [[id number]]  (Normal) Collected:  09/09/19 1916    Specimen:  Blood Updated:  09/09/19 1953     Troponin I <0.012 ng/mL     Comprehensive Metabolic Panel [[id number]]  (Abnormal) Collected:  09/09/19 1916    Specimen:  Blood Updated:  09/09/19 1943     Glucose 91 mg/dL      BUN 17 mg/dL      Creatinine 0.67 mg/dL      Sodium 139 mmol/L      Potassium 3.8 mmol/L      Chloride 103 mmol/L      CO2 31.0 mmol/L      Calcium 9.0 mg/dL      Total Protein 7.3 g/dL      Albumin 4.00 g/dL      ALT (SGPT) 21 U/L      AST (SGOT) 33 U/L      Alkaline Phosphatase 71 U/L      Total Bilirubin 0.5 mg/dL      eGFR  African Amer 110 mL/min/1.73      Globulin 3.3 gm/dL      A/G Ratio 1.2 g/dL      BUN/Creatinine Ratio 25.4     Anion Gap 5.0 mmol/L     Narrative:       GFR Normal >60  Chronic Kidney Disease <60  Kidney Failure <15    CBC Auto Differential [[id number]]  (Abnormal) Collected:  09/09/19 1916    Specimen:  Blood Updated:  09/09/19 1937     WBC 8.93 10*3/mm3      RBC 2.46 10*6/mm3      Hemoglobin 5.9 g/dL      Hematocrit 20.2 %      MCV 82.1 fL      MCH 24.0 pg      MCHC 29.2 g/dL      RDW 15.9 %      RDW-SD 47.7 fl      MPV 10.4 fL      Platelets 357 10*3/mm3      Neutrophil % 76.0 %      Lymphocyte % 12.1 %      Monocyte % 9.4 %      Eosinophil % 0.9 %      Basophil % 0.6 %      Immature Grans % 1.0 %      Neutrophils, Absolute 6.79 10*3/mm3      Lymphocytes, Absolute 1.08 10*3/mm3      Monocytes, Absolute 0.84 10*3/mm3      Eosinophils, Absolute 0.08 10*3/mm3      Basophils, Absolute 0.05 10*3/mm3      Immature Grans, Absolute 0.09 10*3/mm3      nRBC 0.0 /100 WBC         Imaging Results (all)     None        Hospital Course  Patient is a 58 y.o. female presented to [facility] 9/9/2019 with complaints of shortness of breath with exertion, fatigue and heat intolerance.  Patient has a history of symptomatic anemia, chronic iron deficiency anemia has received IV iron and oral iron previously and received blood  transfusion in the past for symptomatic anemia.  She presented after a visit to her primary care provider as patient was concerned about anemia due to symptoms of shortness of breath with exertion, fatigue and heat intolerance.  Hemoglobin 6.3 at PCP office.  She was advised to present to our facility and hemoglobin was noted to be 5.9 with MCV 87.4.  Patient reported dark stools but is on iron supplementation.  Fecal occult blood positive at outside facility.  Patient denies NSAID use.  She was scheduled for evaluation by Dr. Larios in November.  Troponin negative.    She was admitted to the medical floor with symptomatic anemia, acute on chronic blood loss anemia GI bleed.  She was placed on a Protonix drip.  Fecal occult blood positive.  She was transfused 2 units packed red blood cells initially for hemoglobin 5.9.  Hemoglobin 7.6 with hematocrit 24.7 posttransfusion. Hemoglobin improved to 9.1/29.3.  She was made nothing by mouth and placed on SCDs for deep vein thrombosis prophylaxis.  Transfused a total of 3 units packed red blood cells during hospital stay.  Hemoglobin 8.6 with hematocrit 28.5 at discharge.    Gastroenterology consulted and she was seen by Dr. Cope with impression acute on chronic anemia, Hemoccult positive stool.  Plan for endoscopy 9/10.  Continue PPI drip until endoscopy evaluation.  On 9/10 Dr. 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  No specimens collected.  Recommendations were for diet as tolerated.  Patient would need EUS of antral submucosa.  [facility].  No plans to proceed with colonoscopy at present.  [facility] ([phone]) notified that patient would need EUS as outpatient.  No further orders per GI.  Recommendations for appointment with [facility] in [place], [place] per Dr. Cope. Patient may be interested in appointment with [facility] in [place], [place] as this is closer for her. [facility] to [facility] to make [facility] after [facility].    Dr. Treviño consulted to follow hematology while inpatient.  IV iron continued.  Patient is followed by outpatient hematology through [facility].  Continue oral iron at discharge.    On 9/12/2019 she is stable for discharge.  Hemoglobin 8.6, hematocrit 28.5 on date of discharge.  No black tarry stools.  Patient denies shortness of breath, palpitations.  No oxygen in use.  Have [facility].  I discussed with patient that she needs outpatient EUS and [facility] to make arrangements after discharge.     Physical Exam on Discharge:  /66 (BP Location: Right arm, Patient Position: Sitting)   Pulse 63   Temp 97.9 °F (36.6 °C) (Oral)   Resp 20   Ht 160 cm (63\")   Wt 113 kg (249 lb 3.2 oz)   SpO2 95%   BMI 44.14 kg/m²   Physical Exam   Constitutional: She is oriented to person, place, and time. She appears well-developed and well-nourished. No distress.   Sitting up in bed.  No family in room.   HENT:   Head: Normocephalic and atraumatic.   Eyes: EOM are normal. Pupils are equal, round, and reactive to light.   Neck: Normal range of motion. Neck supple.   Cardiovascular: Normal rate, regular rhythm, normal heart sounds and intact distal pulses. Exam reveals no gallop and no friction rub.   No murmur heard.  Pulmonary/Chest: Effort normal and breath sounds normal. No respiratory distress. She has no wheezes. She has no rales.   No oxygen in use.   Abdominal: Soft. Bowel sounds are normal. She exhibits no distension. There is no tenderness.   Genitourinary:   Genitourinary Comments: Voiding.   Musculoskeletal: Normal range of motion. She exhibits no edema.   Neurological: She is alert and oriented to person, place, and time.   Skin: Skin is warm and dry. No erythema.   Psychiatric: She has a normal mood and affect. Her behavior is normal. Judgment and " thought content normal.     Condition on Discharge: Stable    Discharge Disposition: Home with family    Discharge Diet:   Diet Instructions     Advance Diet As Tolerated      Diet: Regular      Discharge Diet:  Regular        Activity at Discharge:   Activity Instructions     Activity as Tolerated      Activity as Tolerated          Discharge Care Plan / Instructions:   1.  For worsening returning symptoms of shortness of breath with exertion, fatigue seek medical attention.  2.  [facility] to arrange outpatient EUS for gastric mass found on endoscopy 9/10/2019.  [facility] ([phone]) notified that patient will need EUS.  3.  Recommendations for E[facility] as outpatient. [facility].  4.  Continue oral iron daily after discharge.    Discharge Medications:     Discharge Medications      New Medications      Instructions Start Date   ferrous sulfate 325 (65 FE) MG tablet   325 mg, Oral, Daily With Breakfast         Continue These Medications      Instructions Start Date   albuterol (2.5 MG/3ML) 0.083% nebulizer solution  Commonly known as:  PROVENTIL   2.5 mg, Nebulization, Every 4 Hours PRN      Cholecalciferol 2000 units tablet   2,000 Units, Oral, Daily, 2 tablets once daily      fish oil 1000 MG capsule capsule   Oral, Daily With Breakfast      folic acid 1 MG tablet  Commonly known as:  FOLVITE   1 mg, Oral, Daily      furosemide 40 MG tablet  Commonly known as:  LASIX   20 mg, Oral, Daily PRN, Take half tablet every morning as needed      hydrochlorothiazide 25 MG tablet  Commonly known as:  HYDRODIURIL   25 mg, Oral, Daily      ipratropium-albuterol  MCG/ACT inhaler  Commonly known as:  COMBIVENT RESPIMAT   1 puff, Inhalation, 4 Times Daily PRN      isosorbide mononitrate 60 MG 24 hr tablet  Commonly known as:  IMDUR   60 mg, Oral, Daily, Take one and 1/2 tablets by mouth once daily      LACTOBACILLUS PO   Oral, 2 Times Daily, Take one by mouth two times daily      levothyroxine 75 MCG  tablet  Commonly known as:  SYNTHROID, LEVOTHROID   75 mcg, Oral, Daily      loratadine 10 MG tablet  Commonly known as:  CLARITIN   10 mg, Oral, Daily      methocarbamol 500 MG tablet  Commonly known as:  ROBAXIN   500 mg, Oral, 2 Times Daily PRN, Take 1-2 tablets by mouth 2 times daily as needed       MULTI VITAMIN PO   Oral      multivitamin with minerals tablet tablet   1 tablet, Oral, Daily      omeprazole 20 MG capsule  Commonly known as:  priLOSEC   20 mg, Oral, Daily      pantoprazole 40 MG EC tablet  Commonly known as:  PROTONIX   40 mg, Oral, Daily      potassium chloride 20 MEQ packet  Commonly known as:  KLOR-CON   20 mEq, Oral, 2 Times Daily      SAXENDA SC   6 mg/mL, Subcutaneous, Take 0.5mL once daily subcutaneou       simvastatin 20 MG tablet  Commonly known as:  ZOCOR   20 mg, Oral, Nightly, Take 1/2 tablet by mouth nightly       traMADol 50 MG tablet  Commonly known as:  ULTRAM   50 mg, Oral, 3 Times Daily PRN      triamcinolone 0.1 % cream  Commonly known as:  KENALOG   Topical, 2 Times Daily         Stop These Medications    cephalexin 500 MG capsule  Commonly known as:  KEFLEX          Follow-up Appointments:   Follow-up Information     Ilya Cope,  Follow up.    Specialty:  Gastroenterology  Contact information:  [facility] [address] [id number] [phone]             Curtis Anderson MD Follow up in 1 week(s).    Specialties:  Obstetrics and Gynecology, Gynecology  Why:  september 20, 2019 11:00 am. Needs outpatient referral for EUS.  Recommend Dr. Velasquez, in [facility] or Dr. Bauer, [place], [place]. [facility].  Contact information:  [address] [facility] [address] [phone]             [facility]. Call in 1 week(s).    Specialty:  [facility]  Contact information:  [address] [facility] [address] [phone]           Pedro Larios MD Follow up.    Specialty:  Gastroenterology  Why:  At [facility]  September 29, 2019  Contact information:  [address]   [place] [address]  [phone]             Summer Lara MD Follow up.    Why:  October 15, 2019 1:00 pm  See nurse on September 16, 2019 labs at 12:30  Contact information:  [facility]               Future Appointments:  No future appointments.    Test Results Pending at Discharge: none    The above documentation resulted from a face-to-face encounter by me Natty LEE, [facility].    ROSA Back  09/12/19  7:59 AM    Time:  This discharge process required 35 minutes for completion.     Plan discussed with Dr. Christian and patient.    Time spent in face-to-face evaluation, chart review, planning and education 35 minutes.  Please note that portions of this note may have been completed with a voice recognition program. Efforts were made to edit the dictations, but occasionally words are mistranscribed.    I personally discussed the patient in conjunction with ROSA Warner and agree with the assessment, treatment plan, and disposition of the patient as recorded by her. My history, exam, and further recommendations are:     Discussed with Natty. Patient appropriate for discharge yesterday but did not have a ride.  D/C today, [facility] to [facility].    Kerwin Christian MD  09/12/19  5:01 PM

## 2019-09-12 NOTE — PROGRESS NOTES
Discharge Planning Assessment  [facility]     Patient Name: Dominick Robin  MRN: [id number]  Today's Date: 9/12/2019    Admit Date: 9/9/2019    Discharge Needs Assessment     Row Name 09/12/19 1046       Living Environment    Primary Care Provided by  self    Provides Primary Care For  no one    Quality of Family Relationships  helpful;involved;supportive    Able to Return to Prior Arrangements  yes       Resource/Environmental Concerns    Resource/Environmental Concerns  none       Transition Planning    Patient/Family Anticipates Transition to  home    Transportation Anticipated  family or friend will provide       Discharge Needs Assessment    Readmission Within the Last 30 Days  no previous admission in last 30 days    Concerns to be Addressed  no discharge needs identified    Equipment Currently Used at Home  cane, straight;walker, rolling;wheelchair, motorized    Anticipated Changes Related to Illness  none    Equipment Needed After Discharge  none    Discharge Coordination/Progress  Pt lives at home and is returning home today. Her sister will transport her today. Faxed the d/c summary, endo report, and rx to the [facility] clinic at [phone].        Discharge Plan    No documentation.       Destination      No service coordination in this encounter.      Durable Medical Equipment      No service coordination in this encounter.      Dialysis/Infusion      No service coordination in this encounter.      Home Medical Care      No service coordination in this encounter.      Therapy      No service coordination in this encounter.      Community Resources      No service coordination in this encounter.        Expected Discharge Date and Time     Expected Discharge Date Expected Discharge Time    Sep 11, 2019         Demographic Summary    No documentation.       Functional Status    No documentation.       Psychosocial    No documentation.       Abuse/Neglect    No documentation.       Legal    No  documentation.       Substance Abuse    No documentation.       Patient Forms    No documentation.           BEATRIZ Matos

## 2019-09-12 NOTE — PLAN OF CARE
Problem: Patient Care Overview  Goal: Plan of Care Review  Outcome: Ongoing (interventions implemented as appropriate)  accucheck for glucose =85;  [facility];  Complains of pain = to a 4/10 in bilateral shoulders, hips and knees.  Regular diet.   09/12/19 0231   Coping/Psychosocial   Plan of Care Reviewed With patient   Plan of Care Review   Progress improving       Problem: Pain, Chronic (Adult)  Goal: Acceptable Pain/Comfort Level and Functional Ability  Outcome: Ongoing (interventions implemented as appropriate)   09/12/19 0231   Pain, Chronic (Adult)   Acceptable Pain/Comfort Level and Functional Ability making progress toward outcome       Problem: Anemia (Adult)  Goal: Identify Related Risk Factors and Signs and Symptoms  Outcome: Ongoing (interventions implemented as appropriate)   09/12/19 0231   Anemia (Adult)   Related Risk Factors (Anemia) bleeding   Signs and Symptoms (Anemia) fatigue     Goal: Symptom Improvement  Outcome: Ongoing (interventions implemented as appropriate)   09/12/19 0231   Anemia (Adult)   Symptom Improvement making progress toward outcome

## 2019-09-12 NOTE — PAYOR COMM NOTE
"Renee Zarate (58 y.o. Female) 3450   D/C notification    [facility]  loyd phone   Fax        Date of Birth Social Security Number Address Home Phone MRN    1960  [address] [place] [id number] [phone] [id number]    Orthodoxy Marital Status          [facility] Single       Admission Date Admission Type Admitting Provider Attending Provider Department, Room/Bed    9/9/19 Urgent Kerwin Christian MD  [facility] 3C, 387/1    Discharge Date Discharge Disposition Discharge Destination        9/12/2019 Home or Self Care              Attending Provider:  (none)   Allergies:  Motrin [Ibuprofen], Prednisone    Isolation:  None   Infection:  None   Code Status:  CPR    Ht:  160 cm (63\")   Wt:  113 kg (249 lb 3.2 oz)    Admission Cmt:  None   Principal Problem:  Symptomatic anemia [D64.9]                 Active Insurance as of 9/9/2019     Primary Coverage     Payor Plan Insurance Group Employer/Plan Group    [employer] [facility]      Payor Plan Address Payor Plan Phone Number Payor Plan Fax Number Effective Dates    [address] [phone]  9/9/2019 - None Entered    [facility] [address]       Subscriber Name Subscriber Birth Date Member ID       RENEE ZARATE 1960 [id number]                 Emergency Contacts      (Rel.) Home Phone Work Phone Mobile Phone    MAGO ZARATE (Sister) [phone] -- [phone]               Discharge Summary      Natty Coombs, APRN at 9/12/2019  6:59 AM              [facility] Hospital Medicine Services  DISCHARGE SUMMARY     Date of Admission: 9/9/2019  Date of Discharge:  9/12/2019  Primary Care Physician: Curtis Anderson MD    Presenting Problem/History of Present Illness:  GI bleed [K92.2]     Discharge Diagnoses:  Active Hospital Problems    Diagnosis   • **Symptomatic anemia   • Gastric mass   • GI bleed   • Acute on chronic blood loss anemia " (CMS/HCC)   • Morbid obesity (CMS/HCC)   • Lupus   • COPD (chronic obstructive pulmonary disease) (CMS/[facility])     Chief Complaint on Day of Discharge: Feels better.  History of Present Illness on Day of Discharge:   Sitting up in bed.  No family in room.  Patient feels better.  Denies black stool last night.  Denies chest pain, palpitations or shortness of breath.  No oxygen in use.  No hematemesis or vomiting.  Tolerating regular diet.  Denies fever or chills.    Consults:   1.  Dr. Ilya Cope, gastroenterology  2.  Dr. Treviño, hematology     Procedures Performed:   Endoscopy 9/10/2019    Pertinent Test Results:   Laboratory Data:    Results from last 7 days   Lab Units 09/12/19  0616 09/11/19  0916 09/10/19  1640 09/10/19  0706   WBC 10*3/mm3 7.49 8.19  --  8.26   HEMOGLOBIN g/dL 8.6* 8.7* 9.1* 7.6*   HEMATOCRIT % 28.5* 28.3* 29.3* 24.7*   PLATELETS 10*3/mm3 348 352  --  357        Results from last 7 days   Lab Units 09/12/19  0616 09/11/19  1135 09/10/19  0706 09/09/19  1916   SODIUM mmol/L 140 140 141 139   POTASSIUM mmol/L 4.1 3.8 4.2 3.8   CHLORIDE mmol/L 103 102 104 103   CO2 mmol/L 30.0* 29.0 28.0 31.0   BUN mg/dL 10 9 10 17   CREATININE mg/dL 0.53* 0.52* 0.53* 0.67   CALCIUM mg/dL 8.9 9.1 8.5* 9.0   BILIRUBIN mg/dL  --   --   --  0.5   ALK PHOS U/L  --   --   --  71   ALT (SGPT) U/L  --   --   --  21   AST (SGOT) U/L  --   --   --  33   GLUCOSE mg/dL 87 85 78 91     Lab Results (all)     Procedure Component Value Units Date/Time    CBC (No Diff) [[id number]]  (Abnormal) Collected:  09/12/19 0616    Specimen:  Blood Updated:  09/12/19 0650     WBC 7.49 10*3/mm3      RBC 3.38 10*6/mm3      Hemoglobin 8.6 g/dL      Hematocrit 28.5 %      MCV 84.3 fL      MCH 25.4 pg      MCHC 30.2 g/dL      RDW 16.0 %      RDW-SD 47.8 fl      MPV 10.7 fL      Platelets 348 10*3/mm3     Basic Metabolic Panel [[id number]] Collected:  09/12/19 0616    Specimen:  Blood Updated:  09/12/19 0642    POC Glucose Once [[id number]]   (Normal) Collected:  09/11/19 1955    Specimen:  Blood Updated:  09/11/19 2006     Glucose 85 mg/dL      Comment: : [id number] Renard Luxter ID: [id number]       Vitamin B12 [[id number]]  (Normal) Collected:  09/11/19 0916    Specimen:  Blood Updated:  09/11/19 1942     Vitamin B-12 919 pg/mL     Folate [[id number]]  (Normal) Collected:  09/11/19 0916    Specimen:  Blood Updated:  09/11/19 1942     Folate 13.30 ng/mL     POC Glucose Once [[id number]]  (Normal) Collected:  09/11/19 1716    Specimen:  Blood Updated:  09/11/19 1729     Glucose 85 mg/dL      Comment: : [id number] Marlene Mathews ID: [id number]       Haptoglobin [[id number]]  (Normal) Collected:  09/10/19 0703    Specimen:  Blood Updated:  09/11/19 1249     Haptoglobin 161 mg/dL     Basic Metabolic Panel [[id number]]  (Abnormal) Collected:  09/11/19 1135    Specimen:  Blood Updated:  09/11/19 1231     Glucose 85 mg/dL      BUN 9 mg/dL      Creatinine 0.52 mg/dL      Sodium 140 mmol/L      Potassium 3.8 mmol/L      Chloride 102 mmol/L      CO2 29.0 mmol/L      Calcium 9.1 mg/dL      eGFR  African Amer 147 mL/min/1.73      BUN/Creatinine Ratio 17.3     Anion Gap 9.0 mmol/L     Narrative:       GFR Normal >60  Chronic Kidney Disease <60  Kidney Failure <15    Ferritin [[id number]]  (Normal) Collected:  09/11/19 1135    Specimen:  Blood Updated:  09/11/19 1231     Ferritin 23.93 ng/mL     Lactate Dehydrogenase [[id number]]  (Abnormal) Collected:  09/11/19 1135    Specimen:  Blood Updated:  09/11/19 1231      U/L     C-reactive Protein [[id number]]  (Normal) Collected:  09/11/19 1135    Specimen:  Blood Updated:  09/11/19 1231     C-Reactive Protein 0.32 mg/dL     Sedimentation Rate [[id number]]  (Abnormal) Collected:  09/11/19 0916    Specimen:  Blood Updated:  09/11/19 0940     Sed Rate 21 mm/hr     CBC & Differential [[id number]] Collected:  09/11/19 0916    Specimen:  Blood Updated:  09/11/19 0932    Narrative:       The following orders  were created for panel order CBC & Differential.  Procedure                               Abnormality         Status                     ---------                               -----------         ------                     CBC Auto Differential[[id number]]        Abnormal            Final result                 Please view results for these tests on the individual orders.    Reticulocytes [[id number]]  (Abnormal) Collected:  09/11/19 0916    Specimen:  Blood Updated:  09/11/19 0932     Reticulocyte % 3.44 %      Reticulocyte Absolute 0.1180 10*6/mm3     CBC Auto Differential [[id number]]  (Abnormal) Collected:  09/11/19 0916    Specimen:  Blood Updated:  09/11/19 0932     WBC 8.19 10*3/mm3      RBC 3.43 10*6/mm3      Hemoglobin 8.7 g/dL      Hematocrit 28.3 %      MCV 82.5 fL      MCH 25.4 pg      MCHC 30.7 g/dL      RDW 15.4 %      RDW-SD 45.6 fl      MPV 10.7 fL      Platelets 352 10*3/mm3      Neutrophil % 79.5 %      Lymphocyte % 9.8 %      Monocyte % 8.5 %      Eosinophil % 1.0 %      Basophil % 0.5 %      Immature Grans % 0.7 %      Neutrophils, Absolute 6.51 10*3/mm3      Lymphocytes, Absolute 0.80 10*3/mm3      Monocytes, Absolute 0.70 10*3/mm3      Eosinophils, Absolute 0.08 10*3/mm3      Basophils, Absolute 0.04 10*3/mm3      Immature Grans, Absolute 0.06 10*3/mm3      nRBC 0.0 /100 WBC     POC Glucose Once [[id number]]  (Normal) Collected:  09/11/19 0740    Specimen:  Blood Updated:  09/11/19 0810     Glucose 70 mg/dL      Comment: : [id number] Marlene Mathews ID: [id number]       Occult Blood X 1, Stool - Stool, Per Rectum [[id number]]  (Abnormal) Collected:  09/10/19 2326    Specimen:  Stool from Per Rectum Updated:  09/10/19 2335     Fecal Occult Blood Positive    POC Glucose Once [[id number]]  (Normal) Collected:  09/10/19 2125    Specimen:  Blood Updated:  09/10/19 2138     Glucose 101 mg/dL      Comment: : [id number] Mi Link ID: [id number]       POC Glucose Once [[id number]]   (Normal) Collected:  09/10/19 1809    Specimen:  Blood Updated:  09/10/19 1820     Glucose 79 mg/dL      Comment: : [id number] [place] (Malvin) MalloryMeter ID: [id number]       Hemoglobin & Hematocrit, Blood [[id number]]  (Abnormal) Collected:  09/10/19 1640    Specimen:  Blood Updated:  09/10/19 1651     Hemoglobin 9.1 g/dL      Hematocrit 29.3 %     POC Glucose Once [[id number]]  (Abnormal) Collected:  09/10/19 1124    Specimen:  Blood Updated:  09/10/19 1145     Glucose 69 mg/dL      Comment: : [id number] [place] Ball) MalloryMeter ID: [id number]       Urine Drug Screen - Urine, Clean Catch [[id number]]  (Normal) Collected:  09/10/19 1052    Specimen:  Urine, Clean Catch Updated:  09/10/19 1111     Amphetamine Screen, Urine Negative     Barbiturates Screen, Urine Negative     Benzodiazepine Screen, Urine Negative     Cocaine Screen, Urine Negative     Methadone Screen, Urine Negative     Opiate Screen Negative     Phencyclidine (PCP), Urine Negative     THC, Screen, Urine Negative    Narrative:       Negative Thresholds For Drugs Screened in Urine:    Amphetamines          500 ng/ml  Barbiturates          200 ng/ml  Benzodiazepines       200 ng/ml  Cocaine               150 ng/ml  Methadone             150 ng/ml  Opiates               300 ng/ml  Phencyclidine         25 ng/ml  THC                      50 ng/ml    The normal value for all drugs tested is negative. This report includes final unconfirmed screening results.  A positive result by this assay can be, at your request, sent to the Reference Lab for confirmation by gas chromatography. Unconfirmed results must not be used for non-medical purposes, such as employment or legal testing. Clinical consideration should be applied to any drug of abuse test result, particularly when unconfirmed results are used.    Iron Profile [[id number]]  (Abnormal) Collected:  09/09/19 1916    Specimen:  Blood Updated:  09/10/19 1104     Iron 14 mcg/dL      Iron  Saturation 3 %      Transferrin 341 mg/dL      TIBC 508 mcg/dL     POC Glucose Once [[id number]]  (Normal) Collected:  09/10/19 0821    Specimen:  Blood Updated:  09/10/19 0832     Glucose 80 mg/dL      Comment: : [id number] Meeks (Russell) TracyMeter ID: [id number]       Basic Metabolic Panel [[id number]]  (Abnormal) Collected:  09/10/19 0706    Specimen:  Blood Updated:  09/10/19 0823     Glucose 78 mg/dL      BUN 10 mg/dL      Creatinine 0.53 mg/dL      Sodium 141 mmol/L      Potassium 4.2 mmol/L      Chloride 104 mmol/L      CO2 28.0 mmol/L      Calcium 8.5 mg/dL      eGFR  African Amer 144 mL/min/1.73      BUN/Creatinine Ratio 18.9     Anion Gap 9.0 mmol/L     Narrative:       GFR Normal >60  Chronic Kidney Disease <60  Kidney Failure <15    CBC (No Diff) [[id number]]  (Abnormal) Collected:  09/10/19 0706    Specimen:  Blood Updated:  09/10/19 0808     WBC 8.26 10*3/mm3      RBC 3.02 10*6/mm3      Hemoglobin 7.6 g/dL      Hematocrit 24.7 %      MCV 81.8 fL      MCH 25.2 pg      MCHC 30.8 g/dL      RDW 15.3 %      RDW-SD 44.7 fl      MPV 11.4 fL      Platelets 357 10*3/mm3     Hemoglobin A1c [[id number]]  (Abnormal) Collected:  09/09/19 1916    Specimen:  Blood Updated:  09/10/19 0734     Hemoglobin A1C 4.6 %     Narrative:       Less than 6.0           Non-Diabetic Range  6.0-7.0                 ADA Therapeutic Target  Greater than 7.0        Action Suggested    TSH [[id number]]  (Normal) Collected:  09/09/19 1916    Specimen:  Blood Updated:  09/09/19 2011     TSH 3.050 uIU/mL     Troponin [[id number]]  (Normal) Collected:  09/09/19 1916    Specimen:  Blood Updated:  09/09/19 1953     Troponin I <0.012 ng/mL     Comprehensive Metabolic Panel [[id number]]  (Abnormal) Collected:  09/09/19 1916    Specimen:  Blood Updated:  09/09/19 1943     Glucose 91 mg/dL      BUN 17 mg/dL      Creatinine 0.67 mg/dL      Sodium 139 mmol/L      Potassium 3.8 mmol/L      Chloride 103 mmol/L      CO2 31.0 mmol/L      Calcium 9.0  mg/dL      Total Protein 7.3 g/dL      Albumin 4.00 g/dL      ALT (SGPT) 21 U/L      AST (SGOT) 33 U/L      Alkaline Phosphatase 71 U/L      Total Bilirubin 0.5 mg/dL      eGFR  African Amer 110 mL/min/1.73      Globulin 3.3 gm/dL      A/G Ratio 1.2 g/dL      BUN/Creatinine Ratio 25.4     Anion Gap 5.0 mmol/L     Narrative:       GFR Normal >60  Chronic Kidney Disease <60  Kidney Failure <15    CBC Auto Differential [[id number]]  (Abnormal) Collected:  09/09/19 1916    Specimen:  Blood Updated:  09/09/19 1937     WBC 8.93 10*3/mm3      RBC 2.46 10*6/mm3      Hemoglobin 5.9 g/dL      Hematocrit 20.2 %      MCV 82.1 fL      MCH 24.0 pg      MCHC 29.2 g/dL      RDW 15.9 %      RDW-SD 47.7 fl      MPV 10.4 fL      Platelets 357 10*3/mm3      Neutrophil % 76.0 %      Lymphocyte % 12.1 %      Monocyte % 9.4 %      Eosinophil % 0.9 %      Basophil % 0.6 %      Immature Grans % 1.0 %      Neutrophils, Absolute 6.79 10*3/mm3      Lymphocytes, Absolute 1.08 10*3/mm3      Monocytes, Absolute 0.84 10*3/mm3      Eosinophils, Absolute 0.08 10*3/mm3      Basophils, Absolute 0.05 10*3/mm3      Immature Grans, Absolute 0.09 10*3/mm3      nRBC 0.0 /100 WBC         Imaging Results (all)     None        Hospital Course  Patient is a 58 y.o. female presented to [facility] 9/9/2019 with complaints of shortness of breath with exertion, fatigue and heat intolerance.  Patient has a history of symptomatic anemia, chronic iron deficiency anemia has received IV iron and oral iron previously and received blood transfusion in the past for symptomatic anemia.  She presented after a visit to her primary care provider as patient was concerned about anemia due to symptoms of shortness of breath with exertion, fatigue and heat intolerance.  Hemoglobin 6.3 at PCP office.  She was advised to present to our facility and hemoglobin was noted to be 5.9 with MCV 87.4.  Patient reported dark stools but is on iron supplementation.  Fecal occult  blood positive at outside facility.  Patient denies NSAID use.  She was scheduled for evaluation by Dr. Larios in November.  Troponin negative.    She was admitted to the medical floor with symptomatic anemia, acute on chronic blood loss anemia GI bleed.  She was placed on a Protonix drip.  Fecal occult blood positive.  She was transfused 2 units packed red blood cells initially for hemoglobin 5.9.  Hemoglobin 7.6 with hematocrit 24.7 posttransfusion. Hemoglobin improved to 9.1/29.3.  She was made nothing by mouth and placed on SCDs for deep vein thrombosis prophylaxis.  Transfused a total of 3 units packed red blood cells during hospital stay.  Hemoglobin 8.6 with hematocrit 28.5 at discharge.    Gastroenterology consulted and she was seen by Dr. Cope with impression acute on chronic anemia, Hemoccult positive stool.  Plan for endoscopy 9/10.  Continue PPI drip until endoscopy evaluation.  On 9/10 Dr. 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  No specimens collected.  Recommendations were for diet as tolerated.  Patient would need EUS of antral submucosa.  [facility].  No plans to proceed with colonoscopy at present.  Sultana at [facility] ([phone]) notified that patient would need EUS as outpatient.  No further orders per GI.  Recommendations for appointment with [facility] in [place], [place] per Dr. Cope. Patient may be interested in appointment with [facility] in [place], [place] as this is closer for her. [facility] to [facility] to make [facility] after discharge.    Dr. Treviño consulted to follow hematology while inpatient.  IV iron continued.  Patient is followed by outpatient hematology through [facility].  Continue oral iron at discharge.    On 9/12/2019 she is stable for discharge.  Hemoglobin 8.6, hematocrit 28.5 on date of discharge.  No black tarry stools.  Patient denies  "shortness of breath, palpitations.  No oxygen in use.  Have [facility].  I discussed with patient that she needs outpatient EUS and [facility] to make arrangements after discharge.     Physical Exam on Discharge:  /66 (BP Location: Right arm, Patient Position: Sitting)   Pulse 63   Temp 97.9 °F (36.6 °C) (Oral)   Resp 20   Ht 160 cm (63\")   Wt 113 kg (249 lb 3.2 oz)   SpO2 95%   BMI 44.14 kg/m²    Physical Exam   Constitutional: She is oriented to person, place, and time. She appears well-developed and well-nourished. No distress.   Sitting up in bed.  No family in room.   HENT:   Head: Normocephalic and atraumatic.   Eyes: EOM are normal. Pupils are equal, round, and reactive to light.   Neck: Normal range of motion. Neck supple.   Cardiovascular: Normal rate, regular rhythm, normal heart sounds and intact distal pulses. Exam reveals no gallop and no friction rub.   No murmur heard.  Pulmonary/Chest: Effort normal and breath sounds normal. No respiratory distress. She has no wheezes. She has no rales.   No oxygen in use.   Abdominal: Soft. Bowel sounds are normal. She exhibits no distension. There is no tenderness.   Genitourinary:   Genitourinary Comments: Voiding.   Musculoskeletal: Normal range of motion. She exhibits no edema.   Neurological: She is alert and oriented to person, place, and time.   Skin: Skin is warm and dry. No erythema.   Psychiatric: She has a normal mood and affect. Her behavior is normal. Judgment and thought content normal.     Condition on Discharge: Stable    Discharge Disposition: Home with family    Discharge Diet:   Diet Instructions     Advance Diet As Tolerated      Diet: Regular      Discharge Diet:  Regular        Activity at Discharge:   Activity Instructions     Activity as Tolerated      Activity as Tolerated          Discharge Care Plan / Instructions:   1.  For worsening returning symptoms of shortness of " breath with exertion, fatigue seek medical attention.  2.  [facility] [place] to arrange outpatient EUS for gastric mass found on endoscopy 9/10/2019.  Sultana at [facility] ([phone]) notified that patient will need EUS.  3.  Recommendations for EUS as outpatient. [facility] to arrange.  4.  Continue oral iron daily after discharge.    Discharge Medications:     Discharge Medications      New Medications      Instructions Start Date   ferrous sulfate 325 (65 FE) MG tablet   325 mg, Oral, Daily With Breakfast         Continue These Medications      Instructions Start Date   albuterol (2.5 MG/3ML) 0.083% nebulizer solution  Commonly known as:  PROVENTIL   2.5 mg, Nebulization, Every 4 Hours PRN      Cholecalciferol 2000 units tablet   2,000 Units, Oral, Daily, 2 tablets once daily      fish oil 1000 MG capsule capsule   Oral, Daily With Breakfast      folic acid 1 MG tablet  Commonly known as:  FOLVITE   1 mg, Oral, Daily      furosemide 40 MG tablet  Commonly known as:  LASIX   20 mg, Oral, Daily PRN, Take half tablet every morning as needed      hydrochlorothiazide 25 MG tablet  Commonly known as:  HYDRODIURIL   25 mg, Oral, Daily      ipratropium-albuterol  MCG/ACT inhaler  Commonly known as:  COMBIVENT RESPIMAT   1 puff, Inhalation, 4 Times Daily PRN      isosorbide mononitrate 60 MG 24 hr tablet  Commonly known as:  IMDUR   60 mg, Oral, Daily, Take one and 1/2 tablets by mouth once daily      LACTOBACILLUS PO   Oral, 2 Times Daily, Take one by mouth two times daily      levothyroxine 75 MCG tablet  Commonly known as:  SYNTHROID, LEVOTHROID   75 mcg, Oral, Daily      loratadine 10 MG tablet  Commonly known as:  CLARITIN   10 mg, Oral, Daily      methocarbamol 500 MG tablet  Commonly known as:  ROBAXIN   500 mg, Oral, 2 Times Daily PRN, Take 1-2 tablets by mouth 2 times daily as needed       MULTI VITAMIN PO   Oral      multivitamin with minerals tablet tablet   1 tablet, Oral, Daily      omeprazole 20 MG  capsule  Commonly known as:  priLOSEC   20 mg, Oral, Daily      pantoprazole 40 MG EC tablet  Commonly known as:  PROTONIX   40 mg, Oral, Daily      potassium chloride 20 MEQ packet  Commonly known as:  KLOR-CON   20 mEq, Oral, 2 Times Daily      SAXENDA SC   6 mg/mL, Subcutaneous, Take 0.5mL once daily subcutaneou       simvastatin 20 MG tablet  Commonly known as:  ZOCOR   20 mg, Oral, Nightly, Take 1/2 tablet by mouth nightly       traMADol 50 MG tablet  Commonly known as:  ULTRAM   50 mg, Oral, 3 Times Daily PRN      triamcinolone 0.1 % cream  Commonly known as:  KENALOG   Topical, 2 Times Daily         Stop These Medications    cephalexin 500 MG capsule  Commonly known as:  KEFLEX          Follow-up Appointments:   Follow-up Information     Ilya Cope,  Follow up.    Specialty:  Gastroenterology  Contact information:  [address] [address] [facility] [address]  [facility] [address]  [phone]             Curtis Anderson MD Follow up in 1 week(s).    Specialties:  Obstetrics and Gynecology, Gynecology  Why:  september 20, 2019 11:00 am. Needs outpatient referral for EUS.  Recommend Dr. Velasquez, in [facility] or Dr. Bauer, [place], [place]. [facility].  Contact information:  [address] [facility] [address]  [phone]             [facility]. Call in 1 week(s).    [facility]ty[facility]  [facility]  Contact information:  [address] [facility] [id number] [phone]           Pedro Larios MD Follow up.    Specialty:  Gastroenterology  Why:  At [facility] September 29, 2019  Contact information:  [address]  [facility] [address]  [phone]             Summer Lara MD Follow up.    Why:  October 15, 2019 1:00 pm  See nurse on September 16, 2019 labs at 12:30  Contact information:  [facility]               Future Appointments:  No future appointments.    Test Results Pending at Discharge: none    The above documentation resulted from a face-to-face  encounter by me Natty LEE, [facility].    ROSA Back  09/12/19  7:59 AM    Time:  This discharge process required 35 minutes for completion.     Plan discussed with Dr. Petty and patient.    Time spent in face-to-face evaluation, chart review, planning and education 35 minutes.  Please note that portions of this note may have been completed with a voice recognition program. Efforts were made to edit the dictations, but occasionally words are mistranscribed.            Electronically signed by Natty Coombs APRN at 9/12/2019  8:00 AM       Discharge Order (From admission, onward)    Start     Ordered    09/11/19 1046  Discharge patient  Once     Expected Discharge Date:  09/11/19    Discharge Disposition:  Home or Self Care    Physician of Record for Attribution - Please select from Treatment Team:  ALBERTA PETTY [[id number]]    Review needed by CMO to determine Physician of Record:  No       Question Answer Comment   Physician of Record for Attribution - Please select from Treatment Team ALBERTA PETTY    Review needed by CMO to determine Physician of Record No        09/11/19 1051

## 2019-09-12 NOTE — PROGRESS NOTES
HEMATOLOGY AND MEDICAL ONCOLOGY PROGRESS NOTE          Pt Name: Dominick Robin  YOB: 1960  MRN: [id number]    Date of evaluation: 9/12/2019  History Obtained From:  History obtained from chart review and the patient.    CHIEF COMPLAINT: fatigue    HISTORY OF PRESENT ILLNESS:    Dominick Robin is a 58 y.o. old morbidly obese female  who has several comorbidities to include a history of hypertension, hypothyroidism who presented to [facility] ER 9/9/19 with increased fatigue and reports of black colored stools. She does have a prior history of anemia and has received IV and oral iron therapy in the past.  Dominick was seen by her PCP and was found to have hemoglobin 6.3, and therefore was referred to the emergency.  Apparently she had a fecal occult blood test positive at outside facility.    She has been seen by the GI group, Dr. Larios in the past.    She was started for further care.  Hemoglobin at admission of 5.9/MCV 82, RDW 15.9 with normal WBC and normal platelet counts  The patient was transfused 2 units PRBCs.  She was also seen by GI, started on PPIs and underwent upper endoscopy 9/10/2019 by Dr. Ilya Cope.  Procedure note was reviewed and documented a small, submucosal, non-circumferential mass with no bleeding and no stigmata of recent bleeding found in the gastric antrum.  Recommendation for EGD/EUS of antral submucosal [facility]     Labs 9/9/19:  Iron 14, TIBC 508, Iron saturation 3%  TSH 3.05  CMP: creatinine 0.67,   Stool OB positive    Labs 9/10/19:  · Iron 14, TIBC 508, Iron saturation 3%, Ferritin 23.93  · Abs reticulocyte count 0.1180  · TSH 3.05  · CMP: creatinine 0.67,   · B12 level 919  · folate 13.3   ·   · CRP 0.32  · Sed rate 21    Venofer 200 mg IV 9/11/19  Increase Venofer to 400 mg IV 9/12/19         Subjective   REVIEW OF SYSTEMS:   Review of Systems   Constitutional: Positive for activity change and fatigue. Negative for chills, diaphoresis,  fever and unexpected weight change.        No night sweats       HENT: Negative for dental problem, hearing loss, mouth sores, nosebleeds, sore throat and trouble swallowing.    Eyes:        No blurring of vision, no double vision     Respiratory: Positive for shortness of breath (exertional). Negative for cough and wheezing.         No hemoptysis       Cardiovascular: Positive for chest pain (better). Negative for palpitations and leg swelling.   Gastrointestinal: Positive for blood in stool. Negative for abdominal distention, abdominal pain, constipation, diarrhea, nausea and vomiting.   Endocrine: Negative for cold intolerance, heat intolerance, polydipsia and polyuria.   Genitourinary: Negative for difficulty urinating, dysuria, frequency, hematuria and urgency.   Musculoskeletal: Negative for arthralgias, joint swelling and myalgias.        Weak  H/o lupus     Skin: Negative for color change, pallor and rash.   Allergic/Immunologic: Negative for immunocompromised state.   Neurological: Negative for seizures, syncope, speech difficulty and numbness.   Hematological: Negative for adenopathy. Does not bruise/bleed easily.   Psychiatric/Behavioral: Negative for confusion and suicidal ideas.       Objective   PHYSICAL EXAM:  Physical Exam   Constitutional: She is oriented to person, place, and time. She appears well-developed and well-nourished. No distress.   Morbidly obese     HENT:   Head: Normocephalic and atraumatic.   Right Ear: External ear normal.   Left Ear: External ear normal.   Nose: Nose normal.   Mouth/Throat: Oropharynx is clear and moist.   Eyes: EOM are normal. Right eye exhibits no discharge. Left eye exhibits no discharge. No scleral icterus.   Neck: Neck supple. No JVD present. No tracheal deviation present.   Cardiovascular: Normal rate and regular rhythm.   Pulmonary/Chest: Effort normal and breath sounds normal. No respiratory distress. She has no wheezes. She has no rales.   Abdominal: Soft.  Bowel sounds are normal. She exhibits no distension and no mass. There is no tenderness.   No hepatosplenomegaly     Genitourinary:   Genitourinary Comments: Exam deferred.     Musculoskeletal: She exhibits no edema or tenderness.   Normal ROM to all 4 extremities     Lymphadenopathy:     She has no cervical adenopathy.   No bulky palpable cervical, clavicular, axillary or inguinal adenopathies on the left or right.     Neurological: She is alert and oriented to person, place, and time.   Follows commands. Non-focal.     Skin: Skin is warm and dry. No rash noted.   Psychiatric: She has a normal mood and affect. Her behavior is normal. Thought content normal.   Vitals reviewed.    Labs:  CBC  Results from last 7 days   Lab Units 09/11/19  0916 09/10/19  1640 09/10/19  0706 09/09/19  1916   WBC 10*3/mm3 8.19  --  8.26 8.93   HEMOGLOBIN g/dL 8.7* 9.1* 7.6* 5.9*   HEMATOCRIT % 28.3* 29.3* 24.7* 20.2*   PLATELETS 10*3/mm3 352  --  357 357       Lab Results   Component Value Date     09/11/2019    K 3.8 09/11/2019     09/11/2019    CO2 29.0 09/11/2019    BUN 9 09/11/2019    CREATININE 0.52 (L) 09/11/2019    GLUCOSE 85 09/11/2019    CALCIUM 9.1 09/11/2019    BILITOT 0.5 09/09/2019    ALKPHOS 71 09/09/2019    AST 33 09/09/2019    ALT 21 09/09/2019    AGRATIO 1.2 09/09/2019    GLOB 3.3 09/09/2019       No results found for: INR, PROTIME    Cultures:  No results found for: BLOODCX  No components found for: URINCX      ASSESSMENT/PLAN:        Symptomatic anemia    GI bleed    Acute on chronic blood loss anemia (CMS/HCC)    Morbid obesity (CMS/HCC)    Lupus    COPD (chronic obstructive pulmonary disease) (CMS/HCC)    Gastric mass    Severe anemia, multifactorial - acute blood loss, iron deficiency, chronic disease  Hgb 8.6 (9.1) -status post blood transfusion  OB stool +  endoscopy 9/10/2019 by Dr. Cope: small, submucosal, non-circumferential mass with no bleeding and no stigmata of recent bleeding found in the  gastric antrum.  recommended for EGD/EUS of antral submucosal mass as outpatient - attending making arrangements  receiving PPI    Labs:  · Iron 14, TIBC 508, Iron saturation 3%, Ferritin 23.93  · Abs reticulocyte count 0.1180  · TSH 3.05  · CMP: creatinine 0.67,   · B12 level 919  · folate 13.3   ·   · CRP 0.32  · Sed rate 21    Venofer 200 mg IV 9/11/19  Increase Venofer to 400 mg IV today    Lupus - consider rheumatology referral as outpatient      Hallie Herrera, ROSA  09/12/19  6:49 AM

## 2019-09-13 ENCOUNTER — READMISSION MANAGEMENT (OUTPATIENT)
Dept: CALL CENTER | Facility: HOSPITAL | Age: 59
End: 2019-09-13

## 2019-09-13 LAB
ABO + RH BLD: NORMAL
BH BB BLOOD EXPIRATION DATE: NORMAL
BH BB BLOOD TYPE BARCODE: 6200
BH BB DISPENSE STATUS: NORMAL
BH BB PRODUCT CODE: NORMAL
BH BB UNIT NUMBER: NORMAL
CROSSMATCH INTERPRETATION: NORMAL
UNIT  ABO: NORMAL
UNIT  RH: NORMAL

## 2019-09-13 NOTE — OUTREACH NOTE
Prep Survey      Responses   Facility patient discharged from?  [place]   Is patient eligible?  Yes   Discharge diagnosis  Symptomatic anemia, gastric mass, GI bleed, A/C blood loss anemia, morbid obesity, lupus, COPD, s/p EGD   Does the patient have one of the following disease processes/diagnoses(primary or secondary)?  Other   Does the patient have Home health ordered?  No   Is there a DME ordered?  No   Comments regarding appointments  See AVS   Prep survey completed?  Yes          Nai Zapata RN

## 2019-09-16 ENCOUNTER — READMISSION MANAGEMENT (OUTPATIENT)
Dept: CALL CENTER | Facility: HOSPITAL | Age: 59
End: 2019-09-16

## 2019-09-16 NOTE — OUTREACH NOTE
Medical Week 1 Survey      Responses   Facility patient discharged from?  [place]   Does the patient have one of the following disease processes/diagnoses(primary or secondary)?  Other   Is there a successful TCM telephone encounter documented?  No   Week 1 attempt successful?  Yes   Call start time  1237   Rescheduled  Rescheduled-pt requested   Call end time  1238          Eunice Jordan RN

## 2019-09-17 ENCOUNTER — READMISSION MANAGEMENT (OUTPATIENT)
Dept: CALL CENTER | Facility: HOSPITAL | Age: 59
End: 2019-09-17

## 2019-09-17 NOTE — OUTREACH NOTE
Medical Week 1 Survey      Responses   Facility patient discharged from?  [place]   Does the patient have one of the following disease processes/diagnoses(primary or secondary)?  Other   Is there a successful TCM telephone encounter documented?  No   Week 1 attempt successful?  No   Rescheduled  Revoked          Zehra Ryan RN

## 2019-10-02 ENCOUNTER — TELEPHONE (OUTPATIENT)
Dept: GASTROENTEROLOGY | Facility: CLINIC | Age: 59
End: 2019-10-02

## 2019-10-02 NOTE — TELEPHONE ENCOUNTER
Patient referred to our office for EUS for further evaluation of antral mass found on EGD 9/10    Papers given to maria g  She will call to request referral

## 2019-10-03 NOTE — TELEPHONE ENCOUNTER
[facility]oke [facility] today - they will referral patient for [facility] - I told them we recommended Dr. Velasquez and I sent in a CONCHITA Form.

## 2019-11-14 ENCOUNTER — TRANSCRIBE ORDERS (OUTPATIENT)
Dept: ADMINISTRATIVE | Facility: HOSPITAL | Age: 59
End: 2019-11-14

## 2019-11-14 DIAGNOSIS — D50.9 IRON DEFICIENCY ANEMIA, UNSPECIFIED IRON DEFICIENCY ANEMIA TYPE: Primary | ICD-10-CM

## 2019-11-21 ENCOUNTER — HOSPITAL ENCOUNTER (OUTPATIENT)
Dept: CT IMAGING | Facility: HOSPITAL | Age: 59
Discharge: HOME | End: 2019-11-21
Admitting: INTERNAL MEDICINE

## 2019-11-21 DIAGNOSIS — D50.9 IRON DEFICIENCY ANEMIA, UNSPECIFIED IRON DEFICIENCY ANEMIA TYPE: ICD-10-CM

## 2019-11-21 LAB — CREAT BLDA-MCNC: 0.6 MG/DL (ref 0.6–1.3)

## 2019-11-21 PROCEDURE — 74177 CT ABD & PELVIS W/CONTRAST: CPT

## 2019-11-21 PROCEDURE — 0 IOPAMIDOL PER 1 ML: Performed by: INTERNAL MEDICINE

## 2019-11-21 PROCEDURE — 82565 ASSAY OF CREATININE: CPT

## 2019-11-21 RX ADMIN — IOPAMIDOL 150 ML: 755 INJECTION, SOLUTION INTRAVENOUS at 14:41

## 2019-12-11 ENCOUNTER — TELEPHONE (OUTPATIENT)
Dept: GASTROENTEROLOGY | Facility: CLINIC | Age: 59
End: 2019-12-11

## 2019-12-11 NOTE — TELEPHONE ENCOUNTER
"EUS at [facility] performed by Dr Raz wilson dated 12/3/19    Biopsy for antral mass-negative for significant inflammation, hpylori like organisms, dysplasia, and malignancy    No histologic findings to explain mass.  \"it is possible there is a submucosal mass that has not been sampled        Jonathan,     Can we please obtain op report    Thank you    "

## 2019-12-17 NOTE — TELEPHONE ENCOUNTER
Op note dated 12/3/19-EUS Dr Velasquez    Mod to large hiatal hernia with numerous large jayshree ulcers, antrum with protruding yellowish submucosal lesion at 6 o'clock.  EUS demonstrates 11 mm smooth round gray echo texture homogenous submucosal lesion,  from muscularis.  FNA note performed    Report scanned to chart      Recommendation-consider hernia repair for anemia  Repeat EUS in one year

## 2019-12-26 ENCOUNTER — TELEPHONE (OUTPATIENT)
Dept: GASTROENTEROLOGY | Facility: CLINIC | Age: 59
End: 2019-12-26

## 2020-04-29 ENCOUNTER — TRANSCRIBE ORDERS (OUTPATIENT)
Dept: ADMINISTRATIVE | Facility: HOSPITAL | Age: 60
End: 2020-04-29

## 2020-04-29 DIAGNOSIS — J44.9 OBSTRUCTIVE CHRONIC BRONCHITIS WITHOUT EXACERBATION (HCC): Primary | ICD-10-CM

## 2020-06-02 ENCOUNTER — APPOINTMENT (OUTPATIENT)
Dept: PULMONOLOGY | Facility: HOSPITAL | Age: 60
End: 2020-06-02

## 2020-11-02 ENCOUNTER — HOSPITAL ENCOUNTER (INPATIENT)
Facility: HOSPITAL | Age: 60
LOS: 3 days | Discharge: HOME | End: 2020-11-05
Attending: FAMILY MEDICINE | Admitting: INTERNAL MEDICINE

## 2020-11-02 PROBLEM — K44.9 HIATAL HERNIA: Status: ACTIVE | Noted: 2020-11-02

## 2020-11-02 PROBLEM — L93.0 LUPUS ERYTHEMATOSUS: Status: ACTIVE | Noted: 2019-09-09

## 2020-11-02 PROBLEM — G47.30 SLEEP APNEA: Status: ACTIVE | Noted: 2020-11-02

## 2020-11-02 PROBLEM — D47.2 MGUS (MONOCLONAL GAMMOPATHY OF UNKNOWN SIGNIFICANCE): Status: ACTIVE | Noted: 2020-11-02

## 2020-11-02 PROBLEM — K25.9 CAMERON ULCER: Status: ACTIVE | Noted: 2020-11-02

## 2020-11-02 LAB
HCT VFR BLD AUTO: 21 % (ref 34–46.6)
HGB BLD-MCNC: 6.3 G/DL (ref 12–15.9)

## 2020-11-02 PROCEDURE — 85014 HEMATOCRIT: CPT | Performed by: NURSE PRACTITIONER

## 2020-11-02 PROCEDURE — 84466 ASSAY OF TRANSFERRIN: CPT | Performed by: NURSE PRACTITIONER

## 2020-11-02 PROCEDURE — 86920 COMPATIBILITY TEST SPIN: CPT

## 2020-11-02 PROCEDURE — 86901 BLOOD TYPING SEROLOGIC RH(D): CPT | Performed by: NURSE PRACTITIONER

## 2020-11-02 PROCEDURE — 86900 BLOOD TYPING SEROLOGIC ABO: CPT | Performed by: NURSE PRACTITIONER

## 2020-11-02 PROCEDURE — 86901 BLOOD TYPING SEROLOGIC RH(D): CPT

## 2020-11-02 PROCEDURE — 86900 BLOOD TYPING SEROLOGIC ABO: CPT

## 2020-11-02 PROCEDURE — 85018 HEMOGLOBIN: CPT | Performed by: NURSE PRACTITIONER

## 2020-11-02 PROCEDURE — 83540 ASSAY OF IRON: CPT | Performed by: NURSE PRACTITIONER

## 2020-11-02 PROCEDURE — 86922 COMPATIBILITY TEST ANTIGLOB: CPT

## 2020-11-02 PROCEDURE — 86850 RBC ANTIBODY SCREEN: CPT | Performed by: NURSE PRACTITIONER

## 2020-11-02 RX ORDER — SODIUM CHLORIDE 9 MG/ML
50 INJECTION, SOLUTION INTRAVENOUS CONTINUOUS
Status: DISCONTINUED | OUTPATIENT
Start: 2020-11-02 | End: 2020-11-04

## 2020-11-02 RX ORDER — ONDANSETRON 2 MG/ML
4 INJECTION INTRAMUSCULAR; INTRAVENOUS EVERY 6 HOURS PRN
Status: DISCONTINUED | OUTPATIENT
Start: 2020-11-02 | End: 2020-11-05 | Stop reason: HOSPADM

## 2020-11-02 RX ORDER — ONDANSETRON 4 MG/1
4 TABLET, FILM COATED ORAL EVERY 6 HOURS PRN
Status: DISCONTINUED | OUTPATIENT
Start: 2020-11-02 | End: 2020-11-05 | Stop reason: HOSPADM

## 2020-11-02 RX ORDER — PANTOPRAZOLE SODIUM 40 MG/10ML
80 INJECTION, POWDER, LYOPHILIZED, FOR SOLUTION INTRAVENOUS ONCE
Status: COMPLETED | OUTPATIENT
Start: 2020-11-02 | End: 2020-11-03

## 2020-11-02 RX ORDER — SODIUM CHLORIDE 0.9 % (FLUSH) 0.9 %
10 SYRINGE (ML) INJECTION EVERY 12 HOURS SCHEDULED
Status: DISCONTINUED | OUTPATIENT
Start: 2020-11-02 | End: 2020-11-05 | Stop reason: HOSPADM

## 2020-11-02 RX ORDER — LEVOTHYROXINE SODIUM ANHYDROUS 100 UG/5ML
37.5 INJECTION, POWDER, LYOPHILIZED, FOR SOLUTION INTRAVENOUS
Status: DISCONTINUED | OUTPATIENT
Start: 2020-11-03 | End: 2020-11-04

## 2020-11-02 RX ORDER — SODIUM CHLORIDE 0.9 % (FLUSH) 0.9 %
10 SYRINGE (ML) INJECTION AS NEEDED
Status: DISCONTINUED | OUTPATIENT
Start: 2020-11-02 | End: 2020-11-05 | Stop reason: HOSPADM

## 2020-11-03 ENCOUNTER — APPOINTMENT (OUTPATIENT)
Dept: GENERAL RADIOLOGY | Facility: HOSPITAL | Age: 60
End: 2020-11-03

## 2020-11-03 ENCOUNTER — APPOINTMENT (OUTPATIENT)
Dept: CARDIOLOGY | Facility: HOSPITAL | Age: 60
End: 2020-11-03

## 2020-11-03 LAB
ABO GROUP BLD: NORMAL
ANION GAP SERPL CALCULATED.3IONS-SCNC: 5 MMOL/L (ref 5–15)
BASOPHILS # BLD AUTO: 0.05 10*3/MM3 (ref 0–0.2)
BASOPHILS NFR BLD AUTO: 1 % (ref 0–1.5)
BH CV ECHO MEAS - AO MAX PG (FULL): 13.9 MMHG
BH CV ECHO MEAS - AO MAX PG: 18 MMHG
BH CV ECHO MEAS - AO MEAN PG (FULL): 8 MMHG
BH CV ECHO MEAS - AO MEAN PG: 10 MMHG
BH CV ECHO MEAS - AO ROOT AREA (BSA CORRECTED): 1.4
BH CV ECHO MEAS - AO ROOT AREA: 7.5 CM^2
BH CV ECHO MEAS - AO ROOT DIAM: 3.1 CM
BH CV ECHO MEAS - AO V2 MAX: 212 CM/SEC
BH CV ECHO MEAS - AO V2 MEAN: 147 CM/SEC
BH CV ECHO MEAS - AO V2 VTI: 55.7 CM
BH CV ECHO MEAS - AVA(I,A): 1.7 CM^2
BH CV ECHO MEAS - AVA(I,D): 1.7 CM^2
BH CV ECHO MEAS - AVA(V,A): 1.5 CM^2
BH CV ECHO MEAS - AVA(V,D): 1.5 CM^2
BH CV ECHO MEAS - BSA(HAYCOCK): 2.4 M^2
BH CV ECHO MEAS - BSA: 2.2 M^2
BH CV ECHO MEAS - BZI_BMI: 46.1 KILOGRAMS/M^2
BH CV ECHO MEAS - BZI_METRIC_HEIGHT: 160 CM
BH CV ECHO MEAS - BZI_METRIC_WEIGHT: 117.9 KG
BH CV ECHO MEAS - EDV(CUBED): 128 ML
BH CV ECHO MEAS - EDV(MOD-SP4): 72.2 ML
BH CV ECHO MEAS - EDV(TEICH): 120.5 ML
BH CV ECHO MEAS - EF(CUBED): 82.3 %
BH CV ECHO MEAS - EF(MOD-SP4): 63.2 %
BH CV ECHO MEAS - EF(TEICH): 74.8 %
BH CV ECHO MEAS - ESV(CUBED): 22.7 ML
BH CV ECHO MEAS - ESV(MOD-SP4): 26.6 ML
BH CV ECHO MEAS - ESV(TEICH): 30.3 ML
BH CV ECHO MEAS - FS: 43.8 %
BH CV ECHO MEAS - IVS/LVPW: 1
BH CV ECHO MEAS - IVSD: 1.1 CM
BH CV ECHO MEAS - LA DIMENSION: 3.2 CM
BH CV ECHO MEAS - LA/AO: 1
BH CV ECHO MEAS - LAT PEAK E' VEL: 11.1 CM/SEC
BH CV ECHO MEAS - LV DIASTOLIC VOL/BSA (35-75): 33.4 ML/M^2
BH CV ECHO MEAS - LV MASS(C)D: 205.9 GRAMS
BH CV ECHO MEAS - LV MASS(C)DI: 95.2 GRAMS/M^2
BH CV ECHO MEAS - LV MAX PG: 4.1 MMHG
BH CV ECHO MEAS - LV MEAN PG: 2 MMHG
BH CV ECHO MEAS - LV SYSTOLIC VOL/BSA (12-30): 12.3 ML/M^2
BH CV ECHO MEAS - LV V1 MAX: 101 CM/SEC
BH CV ECHO MEAS - LV V1 MEAN: 71.5 CM/SEC
BH CV ECHO MEAS - LV V1 VTI: 30.4 CM
BH CV ECHO MEAS - LVIDD: 5 CM
BH CV ECHO MEAS - LVIDS: 2.8 CM
BH CV ECHO MEAS - LVLD AP4: 8.1 CM
BH CV ECHO MEAS - LVLS AP4: 6.4 CM
BH CV ECHO MEAS - LVOT AREA (M): 3.1 CM^2
BH CV ECHO MEAS - LVOT AREA: 3.1 CM^2
BH CV ECHO MEAS - LVOT DIAM: 2 CM
BH CV ECHO MEAS - LVPWD: 1.1 CM
BH CV ECHO MEAS - MED PEAK E' VEL: 6.5 CM/SEC
BH CV ECHO MEAS - MV A MAX VEL: 91.2 CM/SEC
BH CV ECHO MEAS - MV DEC SLOPE: 671.5 CM/SEC^2
BH CV ECHO MEAS - MV DEC TIME: 0.17 SEC
BH CV ECHO MEAS - MV E MAX VEL: 141 CM/SEC
BH CV ECHO MEAS - MV E/A: 1.5
BH CV ECHO MEAS - MV P1/2T MAX VEL: 145 CM/SEC
BH CV ECHO MEAS - MV P1/2T: 63.2 MSEC
BH CV ECHO MEAS - MVA P1/2T LCG: 1.5 CM^2
BH CV ECHO MEAS - MVA(P1/2T): 3.5 CM^2
BH CV ECHO MEAS - PA MAX PG: 8.8 MMHG
BH CV ECHO MEAS - PA V2 MAX: 148 CM/SEC
BH CV ECHO MEAS - PI END-D VEL: 126 CM/SEC
BH CV ECHO MEAS - RAP SYSTOLE: 5 MMHG
BH CV ECHO MEAS - RVSP: 43.9 MMHG
BH CV ECHO MEAS - SI(AO): 194.5 ML/M^2
BH CV ECHO MEAS - SI(CUBED): 48.7 ML/M^2
BH CV ECHO MEAS - SI(LVOT): 44.2 ML/M^2
BH CV ECHO MEAS - SI(MOD-SP4): 21.1 ML/M^2
BH CV ECHO MEAS - SI(TEICH): 41.7 ML/M^2
BH CV ECHO MEAS - SV(AO): 420.4 ML
BH CV ECHO MEAS - SV(CUBED): 105.4 ML
BH CV ECHO MEAS - SV(LVOT): 95.5 ML
BH CV ECHO MEAS - SV(MOD-SP4): 45.6 ML
BH CV ECHO MEAS - SV(TEICH): 90.1 ML
BH CV ECHO MEAS - TR MAX VEL: 312 CM/SEC
BH CV ECHO MEASUREMENTS AVERAGE E/E' RATIO: 16.02
BLD GP AB SCN SERPL QL: NEGATIVE
BUN SERPL-MCNC: 11 MG/DL (ref 6–20)
BUN/CREAT SERPL: 21.2 (ref 7–25)
CALCIUM SPEC-SCNC: 8.7 MG/DL (ref 8.6–10.5)
CHLORIDE SERPL-SCNC: 106 MMOL/L (ref 98–107)
CHOLEST SERPL-MCNC: 145 MG/DL (ref 0–200)
CO2 SERPL-SCNC: 30 MMOL/L (ref 22–29)
CREAT SERPL-MCNC: 0.52 MG/DL (ref 0.57–1)
DEPRECATED RDW RBC AUTO: 46.7 FL (ref 37–54)
EOSINOPHIL # BLD AUTO: 0.1 10*3/MM3 (ref 0–0.4)
EOSINOPHIL NFR BLD AUTO: 2 % (ref 0.3–6.2)
ERYTHROCYTE [DISTWIDTH] IN BLOOD BY AUTOMATED COUNT: 15.3 % (ref 12.3–15.4)
GFR SERPL CREATININE-BSD FRML MDRD: 147 ML/MIN/1.73
GLUCOSE SERPL-MCNC: 86 MG/DL (ref 65–99)
HBA1C MFR BLD: 4.9 % (ref 4.8–5.6)
HCT VFR BLD AUTO: 21.4 % (ref 34–46.6)
HCT VFR BLD AUTO: 28.9 % (ref 34–46.6)
HDLC SERPL-MCNC: 60 MG/DL (ref 40–60)
HGB BLD-MCNC: 6.4 G/DL (ref 12–15.9)
HGB BLD-MCNC: 9.1 G/DL (ref 12–15.9)
IMM GRANULOCYTES # BLD AUTO: 0.04 10*3/MM3 (ref 0–0.05)
IMM GRANULOCYTES NFR BLD AUTO: 0.8 % (ref 0–0.5)
IRON 24H UR-MRATE: 18 MCG/DL (ref 37–145)
IRON SATN MFR SERPL: 4 % (ref 20–50)
LDLC SERPL CALC-MCNC: 73 MG/DL (ref 0–100)
LDLC/HDLC SERPL: 1.22 {RATIO}
LEFT ATRIUM VOLUME INDEX: 51 ML/M2
LYMPHOCYTES # BLD AUTO: 0.68 10*3/MM3 (ref 0.7–3.1)
LYMPHOCYTES NFR BLD AUTO: 13.8 % (ref 19.6–45.3)
MCH RBC QN AUTO: 25.2 PG (ref 26.6–33)
MCHC RBC AUTO-ENTMCNC: 29.9 G/DL (ref 31.5–35.7)
MCV RBC AUTO: 84.3 FL (ref 79–97)
MONOCYTES # BLD AUTO: 0.71 10*3/MM3 (ref 0.1–0.9)
MONOCYTES NFR BLD AUTO: 14.4 % (ref 5–12)
NEUTROPHILS NFR BLD AUTO: 3.35 10*3/MM3 (ref 1.7–7)
NEUTROPHILS NFR BLD AUTO: 68 % (ref 42.7–76)
NRBC BLD AUTO-RTO: 0 /100 WBC (ref 0–0.2)
PLATELET # BLD AUTO: 313 10*3/MM3 (ref 140–450)
PMV BLD AUTO: 9.9 FL (ref 6–12)
POTASSIUM SERPL-SCNC: 4 MMOL/L (ref 3.5–5.2)
RBC # BLD AUTO: 2.54 10*6/MM3 (ref 3.77–5.28)
RH BLD: POSITIVE
SODIUM SERPL-SCNC: 141 MMOL/L (ref 136–145)
T&S EXPIRATION DATE: NORMAL
TIBC SERPL-MCNC: 508 MCG/DL (ref 298–536)
TRANSFERRIN SERPL-MCNC: 341 MG/DL (ref 200–360)
TRIGL SERPL-MCNC: 60 MG/DL (ref 0–150)
TSH SERPL DL<=0.05 MIU/L-ACNC: 5.8 UIU/ML (ref 0.27–4.2)
VIT B12 BLD-MCNC: 1047 PG/ML (ref 211–946)
VLDLC SERPL-MCNC: 12 MG/DL (ref 5–40)
WBC # BLD AUTO: 4.93 10*3/MM3 (ref 3.4–10.8)

## 2020-11-03 PROCEDURE — 85018 HEMOGLOBIN: CPT | Performed by: INTERNAL MEDICINE

## 2020-11-03 PROCEDURE — 86900 BLOOD TYPING SEROLOGIC ABO: CPT

## 2020-11-03 PROCEDURE — 36430 TRANSFUSION BLD/BLD COMPNT: CPT

## 2020-11-03 PROCEDURE — 93306 TTE W/DOPPLER COMPLETE: CPT | Performed by: INTERNAL MEDICINE

## 2020-11-03 PROCEDURE — 83036 HEMOGLOBIN GLYCOSYLATED A1C: CPT | Performed by: NURSE PRACTITIONER

## 2020-11-03 PROCEDURE — 82607 VITAMIN B-12: CPT | Performed by: NURSE PRACTITIONER

## 2020-11-03 PROCEDURE — 93306 TTE W/DOPPLER COMPLETE: CPT

## 2020-11-03 PROCEDURE — P9016 RBC LEUKOCYTES REDUCED: HCPCS

## 2020-11-03 PROCEDURE — 80048 BASIC METABOLIC PNL TOTAL CA: CPT | Performed by: NURSE PRACTITIONER

## 2020-11-03 PROCEDURE — 93005 ELECTROCARDIOGRAM TRACING: CPT | Performed by: NURSE PRACTITIONER

## 2020-11-03 PROCEDURE — 85025 COMPLETE CBC W/AUTO DIFF WBC: CPT | Performed by: NURSE PRACTITIONER

## 2020-11-03 PROCEDURE — 80061 LIPID PANEL: CPT | Performed by: NURSE PRACTITIONER

## 2020-11-03 PROCEDURE — 94799 UNLISTED PULMONARY SVC/PX: CPT

## 2020-11-03 PROCEDURE — 71045 X-RAY EXAM CHEST 1 VIEW: CPT

## 2020-11-03 PROCEDURE — 84443 ASSAY THYROID STIM HORMONE: CPT | Performed by: NURSE PRACTITIONER

## 2020-11-03 PROCEDURE — 85014 HEMATOCRIT: CPT | Performed by: INTERNAL MEDICINE

## 2020-11-03 PROCEDURE — 93010 ELECTROCARDIOGRAM REPORT: CPT | Performed by: INTERNAL MEDICINE

## 2020-11-03 PROCEDURE — 86902 BLOOD TYPE ANTIGEN DONOR EA: CPT

## 2020-11-03 PROCEDURE — 99232 SBSQ HOSP IP/OBS MODERATE 35: CPT | Performed by: CLINICAL NURSE SPECIALIST

## 2020-11-03 RX ORDER — ALBUTEROL SULFATE 2.5 MG/3ML
2.5 SOLUTION RESPIRATORY (INHALATION) EVERY 4 HOURS PRN
Status: DISCONTINUED | OUTPATIENT
Start: 2020-11-03 | End: 2020-11-04 | Stop reason: SDUPTHER

## 2020-11-03 RX ADMIN — SODIUM CHLORIDE 8 MG/HR: 900 INJECTION INTRAVENOUS at 14:25

## 2020-11-03 RX ADMIN — SODIUM CHLORIDE 50 ML/HR: 9 INJECTION, SOLUTION INTRAVENOUS at 09:55

## 2020-11-03 RX ADMIN — SODIUM CHLORIDE, PRESERVATIVE FREE 10 ML: 5 INJECTION INTRAVENOUS at 20:58

## 2020-11-03 RX ADMIN — SODIUM CHLORIDE 8 MG/HR: 900 INJECTION INTRAVENOUS at 09:55

## 2020-11-03 RX ADMIN — SODIUM CHLORIDE, PRESERVATIVE FREE 10 ML: 5 INJECTION INTRAVENOUS at 00:04

## 2020-11-03 RX ADMIN — SODIUM CHLORIDE 8 MG/HR: 900 INJECTION INTRAVENOUS at 00:02

## 2020-11-03 RX ADMIN — PANTOPRAZOLE SODIUM 80 MG: 40 INJECTION, POWDER, FOR SOLUTION INTRAVENOUS at 00:01

## 2020-11-03 RX ADMIN — SODIUM CHLORIDE, PRESERVATIVE FREE 10 ML: 5 INJECTION INTRAVENOUS at 08:26

## 2020-11-03 RX ADMIN — SODIUM CHLORIDE 50 ML/HR: 9 INJECTION, SOLUTION INTRAVENOUS at 00:02

## 2020-11-03 RX ADMIN — SODIUM CHLORIDE 8 MG/HR: 900 INJECTION INTRAVENOUS at 17:32

## 2020-11-03 RX ADMIN — SODIUM CHLORIDE 8 MG/HR: 900 INJECTION INTRAVENOUS at 22:23

## 2020-11-03 RX ADMIN — LEVOTHYROXINE SODIUM ANHYDROUS 37.5 MCG: 100 INJECTION, POWDER, LYOPHILIZED, FOR SOLUTION INTRAVENOUS at 05:06

## 2020-11-03 NOTE — CONSULTS
[facility] GASTROENTEROLOGY              Initial Inpatient Consult Note  Dominick Robin  1960    Referring Provider: No Known Provider    Admission: 11/2/2020  Consult date: 11/3/2020  Chief complaint: anemia, hiatal hernia  Primary GI: Dr Cope    Subjective     History of present illness:  Pt admitted with iron def anemia ongoing persistent issue for her. With associated weakness. No current abdominal pain. No shortness of breath. She has had a EUS 12/3/2019 with Dr Velasquez she was noted to have anemia at that time due to hiatal hernia and associated Troy ulcers. Benign submucosal antral lesion of unknown origin. He suggested hernia repair for her anemia. She says that this was to be considered but postponed due to COVID 19. She was unable to follow up. She says she wanted to have this closer to home if possible. He suggested repeat EUS in 1 year.   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 This is what led to the above EUS.   Her H/H on admission was 6.3/21. She is being transfused. Iron 18, Iron Sat 4%.    Past Medical History:  Past Medical History:   Diagnosis Date   • Anemia    • Anxiety    • Bilateral calcaneal spurs    • COPD (chronic obstructive pulmonary disease) (CMS/HCC)    • Disease of thyroid gland    • Diverticulitis    • Dry eyes    • Fibromyalgia    • Headache    • Heart failure (CMS/HCC)    • Hiatal hernia    • History of GI bleed    • Hyperlipidemia    • Hypertension    • Lupus (CMS/HCC)    • Sinusitis        Past Surgical History:  Past Surgical History:   Procedure Laterality Date   • ABDOMINAL SURGERY     • ENDOSCOPY N/A 9/10/2019    Procedure: ESOPHAGOGASTRODUODENOSCOPY WITH ANESTHESIA;  Surgeon: Ilya Cope DO;  Location: [facility] ENDOSCOPY;  Service: Gastroenterology   • LEG SURGERY         Social History:   Social History     Tobacco Use   • Smoking status: Never Smoker   • Smokeless  tobacco: Never Used   Substance Use Topics   • Alcohol use: Never     Frequency: Never        Family History:  Family History   Problem Relation Age of Onset   • Dementia Mother    • Prostate cancer Father        Home Meds:  Medications Prior to Admission   Medication Sig Dispense Refill Last Dose   • albuterol (PROVENTIL) (2.5 MG/3ML) 0.083% nebulizer solution Take 2.5 mg by nebulization Every 4 (Four) Hours As Needed for Wheezing.   Past Week at Unknown time   • Cholecalciferol 2000 units tablet Take 2,000 Units by mouth Daily. 2 tablets once daily   11/2/2020 at Unknown time   • furosemide (LASIX) 40 MG tablet Take 20 mg by mouth Daily As Needed. Take half tablet every morning as needed   Past Week at Unknown time   • guaiFENesin (ROBITUSSIN) 100 MG/5ML solution oral solution Take 100 mg by mouth Every 6 (Six) Hours As Needed.      • ipratropium-albuterol (COMBIVENT RESPIMAT)  MCG/ACT inhaler Inhale 1 puff 4 (Four) Times a Day As Needed for Wheezing.   Past Month at Unknown time   • isosorbide mononitrate (IMDUR) 60 MG 24 hr tablet Take 120 mg by mouth Daily. Take one and 1/2 tablets by mouth once daily   11/2/2020 at Unknown time   • LACTOBACILLUS PO Take 1 tablet by mouth Daily. Take one by mouth two times daily   11/2/2020 at Unknown time   • levothyroxine (SYNTHROID, LEVOTHROID) 75 MCG tablet Take 175 mcg by mouth Daily.   11/1/2020 at Unknown time   • Liraglutide -Weight Management (SAXENDA SC) Inject 6 mg/mL under the skin into the appropriate area as directed. Take 0.5mL once daily subcutaneou   11/2/2020 at Unknown time   • loratadine (CLARITIN) 10 MG tablet Take 10 mg by mouth Daily.   11/2/2020 at Unknown time   • MENTHOL-METHYL SALICYLATE EX Apply 1 application topically Daily As Needed.      • methocarbamol (ROBAXIN) 500 MG tablet Take 500 mg by mouth 2 (Two) Times a Day As Needed for Muscle Spasms. Take 1-2 tablets by mouth 2 times daily as needed   Past Week at Unknown time   • Multiple  "Vitamins-Minerals (MULTIVITAMIN WITH MINERALS) tablet tablet Take 1 tablet by mouth Daily.   11/2/2020 at Unknown time   • Omega-3 Fatty Acids (FISH OIL) 1000 MG capsule capsule Take  by mouth Daily With Breakfast.   11/2/2020 at Unknown time   • potassium chloride (KLOR-CON) 20 MEQ packet Take 20 mEq by mouth Daily.   11/2/2020 at Unknown time   • simvastatin (ZOCOR) 20 MG tablet Take 20 mg by mouth Every Night. Take 1/2 tablet by mouth nightly   11/1/2020 at Unknown time   • traMADol (ULTRAM) 50 MG tablet Take 50 mg by mouth 3 (Three) Times a Day As Needed for Moderate Pain .   11/2/2020 at Unknown time   • ferrous sulfate 325 (65 FE) MG tablet Take 1 tablet by mouth Daily With Breakfast. (Patient not taking: Reported on 11/2/2020) 30 tablet 0 Not Taking at Unknown time       Current Meds:   Hospital Medications (active)       Dose Frequency Start End    albuterol (PROVENTIL) nebulizer solution 0.083% 2.5 mg/3mL 2.5 mg Every 4 Hours PRN 11/3/2020     Route: Nebulization    Cosign for Ordering: Accepted by Rufino Khan MD on 11/3/2020  2:52 AM    levothyroxine sodium injection 37.5 mcg 37.5 mcg Every Early Morning 11/3/2020     Admin Instructions: Reconstitute with 5mL normal saline to give concentration of 20 mcg/mL. Shake well after reconstitution and discard unused portion.    Route: Intravenous    ondansetron (ZOFRAN) injection 4 mg 4 mg Every 6 Hours PRN 11/2/2020     Admin Instructions: If BOTH ondansetron (ZOFRAN) and promethazine (PHENERGAN) are ordered use ondansetron first and THEN promethazine IF ondansetron is ineffective.    Route: Intravenous    Linked Group 1: \"Or\" Linked Group Details        ondansetron (ZOFRAN) tablet 4 mg 4 mg Every 6 Hours PRN 11/2/2020     Admin Instructions: If BOTH ondansetron (ZOFRAN) and promethazine (PHENERGAN) are ordered use ondansetron first and THEN promethazine IF ondansetron is ineffective.    Route: Oral    Linked Group 1: \"Or\" Linked Group Details        " "pantoprazole (PROTONIX) 40 mg in 100mL NS IVPB 8 mg/hr Continuous 11/2/2020 11/6/2020    Admin Instructions: Break seal and mix to activate vial before use    Route: Intravenous    Linked Group 2: \"And\" Linked Group Details        sodium chloride 0.9 % flush 10 mL 10 mL Every 12 Hours Scheduled 11/2/2020     Route: Intravenous    sodium chloride 0.9 % flush 10 mL 10 mL As Needed 11/2/2020     Route: Intravenous    sodium chloride 0.9 % infusion 50 mL/hr Continuous 11/2/2020     Route: Intravenous          Allergies:  Allergies   Allergen Reactions   • Motrin [Ibuprofen] Other (See Comments)     Unknown other than sleepiness   • Prednisone Other (See Comments)     unknown       Review of Systems  Review of Systems   Constitutional: Positive for fatigue. Negative for activity change, appetite change, chills, diaphoresis, fever and unexpected weight change.   HENT: Negative for ear pain, hearing loss, mouth sores, sore throat, trouble swallowing and voice change.    Eyes: Negative.    Respiratory: Negative for cough, choking, shortness of breath and wheezing.    Cardiovascular: Negative for chest pain and palpitations.   Gastrointestinal: Negative for abdominal pain, blood in stool, constipation, diarrhea, nausea and vomiting.   Endocrine: Negative for cold intolerance and heat intolerance.   Genitourinary: Negative for decreased urine volume, dysuria, frequency, hematuria and urgency.   Musculoskeletal: Negative for back pain, gait problem and myalgias.   Skin: Negative for color change, pallor and rash.   Allergic/Immunologic: Negative for food allergies and immunocompromised state.   Neurological: Positive for weakness. Negative for dizziness, tremors, seizures, syncope, light-headedness, numbness and headaches.   Hematological: Negative for adenopathy. Does not bruise/bleed easily.   Psychiatric/Behavioral: Negative for agitation and confusion. The patient is not nervous/anxious.    All other systems reviewed and " are negative.       Objective     Vital Signs  Temp:  [97.8 °F (36.6 °C)-98.6 °F (37 °C)] 97.9 °F (36.6 °C)  Heart Rate:  [64-74] 64  Resp:  [16-18] 16  BP: (102-143)/(58-97) 138/60  Body mass index is 46.16 kg/m².    Physical Exam:  General Appearance:    Alert, cooperative, in no acute distress   Head:    Normocephalic, without obvious abnormality, atraumatic   Eyes:            Lids and lashes normal, conjunctivae and sclerae normal, no icterus, conjunctival pallor   Throat:   No oral lesions, no thrush, oral mucosa moist, posterior oropharynx clear   Neck:   No adenopathy, supple, trachea midline, no thyromegaly, no   carotid bruit, no JVD   Lungs:     Clear to auscultation,respirations regular, even and            unlabored    Heart:    Regular rhythm and normal rate, normal S1 and S2,           no   murmur   Chest Wall:    No abnormalities observed   Abdomen:     Normal bowel sounds, no masses, no organomegaly,     soft nontender, nondistended, no guarding, no rebound      tenderness   Rectal:     Deferred   Extremities:   no edema, no cyanosis   Skin:   No open lesions, bruising or rash   Lymph nodes:   No palpable cervical adenopathy   Psychiatric:  Judgment and insight: normal   Orientation to person place and time: normal   Mood and affect: normal     Results Review:  Results from last 7 days   Lab Units 11/03/20  0648 11/02/20  2306   WBC 10*3/mm3 4.93  --    HEMOGLOBIN g/dL 6.4* 6.3*   HEMATOCRIT % 21.4* 21.0*   PLATELETS 10*3/mm3 313  --        Results from last 7 days   Lab Units 11/03/20  0630   SODIUM mmol/L 141   POTASSIUM mmol/L 4.0   CHLORIDE mmol/L 106   CO2 mmol/L 30.0*   BUN mg/dL 11   CREATININE mg/dL 0.52*   CALCIUM mg/dL 8.7   GLUCOSE mg/dL 86              Radiology Review:  Imaging Results (Last 72 Hours)     Procedure Component Value Units Date/Time    XR Chest 1 View [[id number]] Collected: 11/03/20 0713     Updated: 11/03/20 0719    Narrative:      Frontal upright radiograph of the chest  11/3/2020 4:10 AM CST     HISTORY: COPD     COMPARISON: None.     FINDINGS:      6 mm left apical calcified granuloma. Minimal atelectasis at the right  costophrenic angle. No pleural effusion or pneumothorax. Cardiomegaly is  present. Mild central pulmonary congestion. No septal thickening or  subpleural Kerley B lines. Right chest wall [id number]. No acute bony  abnormality.        Impression:      1. Cardiomegaly with mild central vascular congestion. No consolidation  or pulmonary edema.     This report was finalized on 11/03/2020 07:15 by Dr Oren Sandhu, .          Assessment/Plan         GI bleed    Acute on chronic blood loss anemia    Morbid obesity with body mass index (BMI) of 50.0 to 59.9 in adult (CMS/[facility])    Lupus erythematosus    COPD (chronic obstructive pulmonary disease) (CMS/[facility])    Hiatal hernia    Troy ulcer    MGUS (monoclonal gammopathy of unknown significance)    Sleep apnea      1. Iron def anemia with known hiatal hernia with Troy Lesions.     Being transfused  Consult surgery  Follow H/H     EMR Dragon/transcription disclaimer: Much of this encounter note is electronic transcription/translation of spoken language to printed text. The electronic translation of spoken language may be erroneous, or at times, nonsensical words or phrases may be inadvertently transcribed. Although I have reviewed the note for such errors, some may still exist.  ROSA Willoughby  [facility]astroenter[facility]  11/03/20  09:27 CST    Anemia is felt secondary to her large hiatal hernia with Troy lesions.  Previous recommendations for repair of her hiatal hernia.  We will ask our surgery staff to see and evaluate for possible hiatal hernia repair.

## 2020-11-03 NOTE — PLAN OF CARE
VSS, up with SBA, room air, Hg at 6.4 this AM - orders for infusion of 2Units RBC - currently infusing, safety maintained, asymptomatic of low Hemoglobin, A/O X3, IVF and protonix drip as ordered, NPO currently until surgery sees, will continue to monitor    Problem: Adult Inpatient Plan of Care  Goal: Plan of Care Review  Outcome: Ongoing, Progressing  Flowsheets  Taken 11/3/2020 1465 by Marisol Allen, RN  Progress: no change  Taken 11/3/2020 2092 by Anna Marie Johnson, RN  Plan of Care Reviewed With: patient   Goal Outcome Evaluation:  Plan of Care Reviewed With: patient  Progress: no change

## 2020-11-03 NOTE — H&P
"    [facility] Medicine Services  HISTORY AND PHYSICAL    Date of Admission: 11/2/2020  Primary Care Physician: Curtis Anderson MD    Subjective     Chief Complaint: Acute blood loss anemia, GI bleed    History of Present Illness  Dominick Robin is a 59-year-old female followed by [facility].  She was seen today for routine lab following chronic normocytic anemia-long history of anemia with need for multiple transfusions, past EGD's is positive for erosive gastritis, hiatal hernia with superficial erosions and ulcerations-Troy lesions treated with PPI.  Per documentation 7/2017 patient was evaluated for hernia surgery however was deemed high risk candidate therefore procedure was not completed.  Biopsy right femoral lymph node 6/2017 followed by bone marrow aspiration biopsy 7/2017 -negative for cancer.  Patient has been seen by GI in [place] at [facility] 12/3/2019 was diagnosed with hiatal hernia and Troy ulcers with recommendation for hernia repair.  She was subsequently seen in consult by Katelynn Hunter MD 1/2/2020 however due to Covid outbreak surgery was placed on hold.  Patient is followed by hematology/oncology for IgM monoclonal gammopathy of uncertain significance patient also noted to have lupus with chronic pain, hypothyroidism, COPD, anxiety/depression.    Due to recurrent anemia [facility] for further evaluation by GI and general surgeon.  Prior to transport both Irais Ribeiro and Harriet agreed to consult if hospitalist will admit.  Patient reports recent increase fatigability, shortness of breathing with exertion, feeling cold, intermittent chest pain all symptoms of \"low iron\".   [facility] studies were moved from 11/9-11/2 to assess for worsening anemia.  Results revealed hemoglobin 6 compared to 9.8 on 8/6/2020.  Ferritin was 6, iron and TIBC not obtained. She " states that she has not noticed overt blood or black stools however was told stool sample today was positive.  Patient has chronic joint pain however feeling okay at this moment.  She has no complaints.  She is admitted for further evaluation treatment.    Review of Systems   A 10 point review of systems was completed, all negative except for those discussed in HPI    Past Medical History:   Past Medical History:   Diagnosis Date   • Anemia    • Anxiety    • Bilateral calcaneal spurs    • COPD (chronic obstructive pulmonary disease) (CMS/HCC)    • Disease of thyroid gland    • Diverticulitis    • Dry eyes    • Fibromyalgia    • Headache    • Heart failure (CMS/HCC)    • Hiatal hernia    • History of GI bleed    • Hyperlipidemia    • Hypertension    • Lupus (CMS/HCC)    • Sinusitis        Past Surgical History:   Past Surgical History:   Procedure Laterality Date   • ABDOMINAL SURGERY     • ENDOSCOPY N/A 9/10/2019    Procedure: ESOPHAGOGASTRODUODENOSCOPY WITH ANESTHESIA;  Surgeon: Ilya Cope DO;  Location: [facility] ENDOSCOPY;  Service: Gastroenterology   • LEG SURGERY         Family History: family history includes Dementia in her mother; Prostate cancer in her father.    Social History:  reports that she has never smoked. She has never used smokeless tobacco. She reports that she does not drink alcohol or use drugs.    Code Status: Full, if unable speak for self family will speak for her      Allergies:  Allergies   Allergen Reactions   • Motrin [Ibuprofen] Other (See Comments)     Unknown other than sleepiness   • Prednisone Other (See Comments)     unknown       Medications:  Prior to Admission medications    Medication Sig Start Date End Date Taking? Authorizing Provider   albuterol (PROVENTIL) (2.5 MG/3ML) 0.083% nebulizer solution Take 2.5 mg by nebulization Every 4 (Four) Hours As Needed for Wheezing.    Provider, MD Den   Cholecalciferol 2000 units tablet Take 2,000 Units by mouth Daily. 2  tablets once daily    Den Randall MD   ferrous sulfate 325 (65 FE) MG tablet Take 1 tablet by mouth Daily With Breakfast. 9/11/19   Black Briceno APRN   folic acid (FOLVITE) 1 MG tablet Take 1 mg by mouth Daily.    Den Randall MD   furosemide (LASIX) 40 MG tablet Take 20 mg by mouth Daily As Needed. Take half tablet every morning as needed    Den Randall MD   hydrochlorothiazide (HYDRODIURIL) 25 MG tablet Take 25 mg by mouth Daily.    Den Randall MD   ipratropium-albuterol (COMBIVENT RESPIMAT)  MCG/ACT inhaler Inhale 1 puff 4 (Four) Times a Day As Needed for Wheezing.    Den Randall MD   isosorbide mononitrate (IMDUR) 60 MG 24 hr tablet Take 60 mg by mouth Daily. Take one and 1/2 tablets by mouth once daily    Den Randall MD   LACTOBACILLUS PO Take  by mouth 2 (Two) Times a Day. Take one by mouth two times daily    Den Randall MD   levothyroxine (SYNTHROID, LEVOTHROID) 75 MCG tablet Take 75 mcg by mouth Daily.    Den Randall MD   Liraglutide -Weight Management (SAXENDA SC) Inject 6 mg/mL under the skin into the appropriate area as directed. Take 0.5mL once daily subcutaneou    Den Randall MD   loratadine (CLARITIN) 10 MG tablet Take 10 mg by mouth Daily.    Den Randall MD   methocarbamol (ROBAXIN) 500 MG tablet Take 500 mg by mouth 2 (Two) Times a Day As Needed for Muscle Spasms. Take 1-2 tablets by mouth 2 times daily as needed    Den Randall MD   Multiple Vitamin (MULTI VITAMIN PO) Take  by mouth.    Den Randall MD   Multiple Vitamins-Minerals (MULTIVITAMIN WITH MINERALS) tablet tablet Take 1 tablet by mouth Daily.    Den Randall MD   Omega-3 Fatty Acids (FISH OIL) 1000 MG capsule capsule Take  by mouth Daily With Breakfast.    Den Randall MD   omeprazole (priLOSEC) 20 MG capsule Take 20 mg by mouth Daily.    Den Randall MD   pantoprazole (PROTONIX) 40 MG  "EC tablet Take 40 mg by mouth Daily.    Den Randall MD   potassium chloride (KLOR-CON) 20 MEQ packet Take 20 mEq by mouth 2 (Two) Times a Day.    Den Randall MD   simvastatin (ZOCOR) 20 MG tablet Take 20 mg by mouth Every Night. Take 1/2 tablet by mouth nightly    Den Randall MD   traMADol (ULTRAM) 50 MG tablet Take 50 mg by mouth 3 (Three) Times a Day As Needed for Moderate Pain .    Den Randall MD   triamcinolone (KENALOG) 0.1 % cream Apply  topically 2 (Two) Times a Day.    Den Randall MD       Objective     /97 (BP Location: Left arm, Patient Position: Lying)   Pulse 72   Temp 98.6 °F (37 °C) (Oral)   Resp 18   Ht 160 cm (63\")   Wt (!) 150 kg (331 lb 3.2 oz)   SpO2 95%   Breastfeeding No   BMI 58.67 kg/m²   Physical Exam  Vitals signs reviewed.   Constitutional:       Appearance: She is obese.   HENT:      Head: Normocephalic and atraumatic.      Mouth/Throat:      Mouth: Mucous membranes are moist.   Eyes:      Extraocular Movements: Extraocular movements intact.      Pupils: Pupils are equal, round, and reactive to light.   Neck:      Musculoskeletal: Normal range of motion and neck supple.   Cardiovascular:      Rate and Rhythm: Normal rate and regular rhythm.      Pulses: Normal pulses.      Heart sounds: Normal heart sounds.   Pulmonary:      Effort: Pulmonary effort is normal.      Breath sounds: Normal breath sounds.   Abdominal:      General: Bowel sounds are normal.      Palpations: Abdomen is soft.      Tenderness: There is no abdominal tenderness.      Comments: Protuberant   Musculoskeletal: Normal range of motion.         General: Signs of injury ( Brace on right knee, currently undergoing therapy) present.      Right lower leg: Edema ( Trace) present.      Left lower leg: Edema ( Trace) present.   Skin:     General: Skin is warm and dry.   Neurological:      General: No focal deficit present.      Mental Status: She is alert and " oriented to person, place, and time.   Psychiatric:         Mood and Affect: Mood normal.         Behavior: Behavior normal.       Pertinent Data: Obtained at outlying facility today  Sodium 141, potassium 3.7, chloride 104, BUN 12, creatinine 0.7, CO2 28, glucose 108,   Ferritin 6, WBC 6.2, hemoglobin 6, hematocrit 21.2, MCV 85.8, platelet count 324,000  Verbal negative COVID    Assessment / Plan     Assessment:   Active Hospital Problems    Diagnosis   • Hiatal hernia   • Troy ulcer   • MGUS (monoclonal gammopathy of unknown significance)   • Sleep apnea   • Acute on chronic blood loss anemia   • GI bleed   • Lupus erythematosus   • Morbid obesity with body mass index (BMI) of 50.0 to 59.9 in adult (CMS/[facility])   • COPD (chronic obstructive pulmonary disease) (CMS/[facility])       Plan:   1.  Admit as inpatient  2.  Consult general surgery and GI (Irais Syed and Harriet aware of patient transfer)  3.  Incentive spirometry, supplemental oxygen-will need to wear at bedtime due to sleep apnea, RT to initiate breathing treatment protocol, continuous pulse oximetry, chest xray in am  4.  Stat labs-H&H, type and cross and transfuse 1 unit packed red blood cells, anemia substrates  5.  H&H every 8 hours, check stool for occult blood  6.  Labs in a.m.  7.  Home medications reviewed on hold due to n.p.o. status-thyroxine converted to IV dose  8.  Protonix bolus and drip  9.  Normal saline at 50 mL/hour  10.  DVT prophylaxis with SCDs  11.  N.p.o.  12.  ECHO, EKG in am    I discussed the patient's findings and my recommendations with: Rufino Khan MD  Time spent 45 minutes    Patient seen and examined by me on 11/2/2020 at 10:45 PM    Electronically signed by ROSA Camacho, 11/02/20, 23:49 CST.    I personally evaluated and examined the patient in conjunction with ROSA Ling and agree with the assessment, treatment plan, and disposition of the patient as recorded by her. My history, exam, and  further recommendations are:   Patient evaluated the time of admission agree with assessment plan examinations.  Patient with significant issues with anemia and GI bleeding.  Agree with consults surgery and GI to further assess and render an opinion plan.  Appreciate their assistance.    Electronically signed by Rufino Khan MD, 11/3/2020, 06:37 CST.

## 2020-11-03 NOTE — PROGRESS NOTES
RT Nebulizer Protocol    Assessment tool to be used for patients with existing breathing treatments ordered by hospitalists                                                                  0  1  2  3  4      Respiratory History   No Smoking      Smoking History      1 Pack/Day      Pulmonary Disease   x   Exacerbation        Respiratory Rate   Normal   x   20-25      Dyspneic      Accessory Muscles      Severe Dyspnea        Breath Sounds   Clear   x   Crackles      Crackles/ Rhonchi      Wheezing      Absent/ Severe Wheezing        Chest   X-ray   Clear      1 Lobe Infiltration/ Consolidation/ PE      2 Lobe Same Lung Infiltration/ Consolidation/ PE       2 Lobe Infiltration/ Both Lungs/ Consolidation/ PE      Both Lungs/ More Than 1 Lobe/ Atelectasis/ Consolidation/  PE        Cough   Strong Non- Productive   x   Excessive Secretions/ Strong Cough      Excessive Secretions/ Weak Cough      Thick Bronchial Secretions/ Weak Cough      Thick Bronchial Secretions/ No Cough        Total Patient Score =  3  0-4=Q4 PRN  5-9=TID and Q4 PRN  10-14=QID and Q3 PRN  15-19=Q4 and Q2 PRN  20=Q3 and Q2 PRN    Bronchopulmonary Hygiene (CPT)   Q4 ATC Copious secretions, dyspnea, unable to sleep, mucus plug    QID & Q4 PRN Moderate secretions    TID Small amounts of secretions w/ poor cough and history of secretions    BID Unable to deep breathe and cough spontaneously       Lung Expansion Therapy (PEP)   Q4 & PRN at night Severe atelectasis, poor oxygenation    QID  High risk for persistent atelectasis, existence of same    TID At risk for developing atelectasis    BID Unable to deep breathe and cough spontaneously     Instruct, 1 follow up Patients able to perform well on their own        No chest x-ray available  Patient use an inhaler prn at home

## 2020-11-03 NOTE — PROGRESS NOTES
"1           [facility] Medicine Services  INPATIENT PROGRESS NOTE    Patient Name: Dominick Robin  Date of Admission: 11/2/2020  Today's Date: 11/03/20  Length of Stay: 1  Primary Care Physician: Curtis Anderson MD    Subjective   Chief Complaint: Follow-up  HPI   I am informed about the critical care value.  Hemoglobin reportedly 6.4.  I am informed this was after a unit of packed RBC  Noted that he came in with hemoglobin of 6.3.  Iron profile showed iron of 18, saturation of 4, transferrin 341 and TIBC of 508.  I am informed that patient was tested positive for occult blood test.  BUN is 11.  His CBC showed normal platelets of 313, white count of 493.    She was admitted for acute blood loss anemia from GI bleed and has had prior multiple transfusion.  Record indicates EGD in the past showing erosive gastritis, hiatal hernia and superficial erosions and ulcerations-Troy lesions treated with PPI.  She has had prior bone marrow biopsy in 2017 which was negative for cancer.  Has been followed by hematology oncology for IgM monoclonal gammopathy of uncertain significance.  Other medical comorbidities reported includes hiatal hernia, hypertension, lupus, sinusitis, colitis, COPD among others.     I instructed nurse Rose to type and cross for 2 units and transfuse.  She complains symptoms of anemia (fatigue, shortness of breath).  She had blood tests through her hematologist who informed her of the hemoglobin of 6.  She was instructed to come to the emergency room.    Complaints of pain due to arthritis and takes \"salicylate\"   Review of Systems     All pertinen lead to t negatives and positives are as above. All other systems have been reviewed and are negative unless otherwise stated.     Objective    Temp:  [97.8 °F (36.6 °C)-98.6 °F (37 °C)] 97.9 °F (36.6 °C)  Heart Rate:  [64-74] 67  Resp:  [16-18] 16  BP: (102-143)/(58-97) 120/63  Physical Exam  Pleasant middle-age woman not " in any distress  Obese  No distress  Alert and oriented x3  Supple neck  Short neck  Moist oral mucosa  Anicteric sclera  Diminished breath sounds due to body habitus, adequate air exchange, no gross crackles or wheezes  S1-S2 difficult to hear due to body habitus.  No gross murmur appreciated  Globular obese abdomen, nontender without any gross hepatosplenomegaly  No cyanosis or clubbing of nails  Could not determine for any presence of edema.  If she has any edema I would consider.  Warm dry skin          Results Review:  I have reviewed the labs, radiology results, and diagnostic studies.    Laboratory Data:   Results from last 7 days   Lab Units 11/03/20  0648 11/02/20  2306   WBC 10*3/mm3 4.93  --    HEMOGLOBIN g/dL 6.4* 6.3*   HEMATOCRIT % 21.4* 21.0*   PLATELETS 10*3/mm3 313  --         Results from last 7 days   Lab Units 11/03/20  0630   SODIUM mmol/L 141   POTASSIUM mmol/L 4.0   CHLORIDE mmol/L 106   CO2 mmol/L 30.0*   BUN mg/dL 11   CREATININE mg/dL 0.52*   CALCIUM mg/dL 8.7   GLUCOSE mg/dL 86       Culture Data:   No results found for: BLOODCX, URINECX, WOUNDCX, MRSACX, RESPCX, STOOLCX    Radiology Data:   Imaging Results (Last 24 Hours)     Procedure Component Value Units Date/Time    XR Chest 1 View [[id number]] Collected: 11/03/20 0713     Updated: 11/03/20 0719    Narrative:      Frontal upright radiograph of the chest 11/3/2020 4:10 AM CST     HISTORY: COPD     COMPARISON: None.     FINDINGS:      6 mm left apical calcified granuloma. Minimal atelectasis at the right  costophrenic angle. No pleural effusion or pneumothorax. Cardiomegaly is  present. Mild central pulmonary congestion. No septal thickening or  subpleural Kerley B lines. Right chest wall [id number]. No acute bony  abnormality.        Impression:      1. Cardiomegaly with mild central vascular congestion. No consolidation  or pulmonary edema.     This report was finalized on 11/03/2020 07:15 by Dr Oren Sandhu, .          I have  reviewed the patient's current medications.     Assessment/Plan     Active Hospital Problems    Diagnosis   • Hiatal hernia   • Troy ulcer   • MGUS (monoclonal gammopathy of unknown significance)   • Sleep apnea   • Acute on chronic blood loss anemia   • GI bleed   • Lupus erythematosus   • Morbid obesity with body mass index (BMI) of 50.0 to 59.9 in adult (CMS/[facility])   • COPD (chronic obstructive pulmonary disease) (CMS/[facility])         GI and general surgery has been consulted  Transfuse 2 units of blood  Heme needs to follow-up with hematologist.  Surprisingly I did not see significant change in the cell lines and CBC.  BUN is as elevated as I would see in someone with upper GI bleed.   Avoid any medications that increase risk for GI bleeding   Protonix drip (this is supported but not currently hanging.)    Await further recommendation from GI and general surgery.  Depending on finding subsequent and of care can be formulated.      Case discussed with nurse Taylor    levothyroxine sodium, 37.5 mcg, Intravenous, Q AM  sodium chloride, 10 mL, Intravenous, Q12H        Discharge Planning: tbd  Electronically signed by Milo Waldrop MD, 11/03/20, 07:45 CST.

## 2020-11-03 NOTE — PLAN OF CARE
Goal Outcome Evaluation:  Plan of Care Reviewed With: patient  Progress: no change  Outcome Summary: Pt denies abd pain/tenderness, no BM this shift, voiding, NPO diet, ivf infusing protonix gtt infusing, 1 unit PRBC's given, up with assist x1 with cane, tele sinus 60-70's, will cont to monitor

## 2020-11-04 LAB
BH BB BLOOD EXPIRATION DATE: NORMAL
BH BB BLOOD TYPE BARCODE: 600
BH BB BLOOD TYPE BARCODE: 6200
BH BB BLOOD TYPE BARCODE: 6200
BH BB DISPENSE STATUS: NORMAL
BH BB PRODUCT CODE: NORMAL
BH BB UNIT NUMBER: NORMAL
CROSSMATCH INTERPRETATION: NORMAL
HCT VFR BLD AUTO: 28.8 % (ref 34–46.6)
HCT VFR BLD AUTO: 28.8 % (ref 34–46.6)
HEMOCCULT STL QL: POSITIVE
HGB BLD-MCNC: 8.9 G/DL (ref 12–15.9)
HGB BLD-MCNC: 8.9 G/DL (ref 12–15.9)
QT INTERVAL: 446 MS
QTC INTERVAL: 434 MS
UNIT  ABO: NORMAL
UNIT  RH: NORMAL

## 2020-11-04 PROCEDURE — 85014 HEMATOCRIT: CPT | Performed by: INTERNAL MEDICINE

## 2020-11-04 PROCEDURE — 82272 OCCULT BLD FECES 1-3 TESTS: CPT | Performed by: INTERNAL MEDICINE

## 2020-11-04 PROCEDURE — 99232 SBSQ HOSP IP/OBS MODERATE 35: CPT | Performed by: CLINICAL NURSE SPECIALIST

## 2020-11-04 PROCEDURE — 85018 HEMOGLOBIN: CPT | Performed by: INTERNAL MEDICINE

## 2020-11-04 RX ORDER — METHOCARBAMOL 500 MG/1
500 TABLET, FILM COATED ORAL 2 TIMES DAILY PRN
Status: DISCONTINUED | OUTPATIENT
Start: 2020-11-04 | End: 2020-11-05 | Stop reason: HOSPADM

## 2020-11-04 RX ORDER — ALBUTEROL SULFATE 2.5 MG/3ML
2.5 SOLUTION RESPIRATORY (INHALATION) EVERY 4 HOURS PRN
Status: DISCONTINUED | OUTPATIENT
Start: 2020-11-04 | End: 2020-11-05 | Stop reason: HOSPADM

## 2020-11-04 RX ORDER — ALBUTEROL SULFATE 90 UG/1
2 AEROSOL, METERED RESPIRATORY (INHALATION) EVERY 4 HOURS PRN
COMMUNITY

## 2020-11-04 RX ORDER — PANTOPRAZOLE SODIUM 40 MG/10ML
40 INJECTION, POWDER, LYOPHILIZED, FOR SOLUTION INTRAVENOUS EVERY 12 HOURS SCHEDULED
Status: DISCONTINUED | OUTPATIENT
Start: 2020-11-04 | End: 2020-11-05 | Stop reason: HOSPADM

## 2020-11-04 RX ORDER — TRAMADOL HYDROCHLORIDE 50 MG/1
50 TABLET ORAL 3 TIMES DAILY PRN
Status: DISCONTINUED | OUTPATIENT
Start: 2020-11-04 | End: 2020-11-05 | Stop reason: HOSPADM

## 2020-11-04 RX ORDER — ISOSORBIDE MONONITRATE 60 MG/1
120 TABLET, EXTENDED RELEASE ORAL DAILY
Status: DISCONTINUED | OUTPATIENT
Start: 2020-11-05 | End: 2020-11-05 | Stop reason: HOSPADM

## 2020-11-04 RX ORDER — ATORVASTATIN CALCIUM 10 MG/1
10 TABLET, FILM COATED ORAL NIGHTLY
Status: DISCONTINUED | OUTPATIENT
Start: 2020-11-04 | End: 2020-11-05 | Stop reason: HOSPADM

## 2020-11-04 RX ADMIN — PANTOPRAZOLE SODIUM 40 MG: 40 INJECTION, POWDER, FOR SOLUTION INTRAVENOUS at 18:50

## 2020-11-04 RX ADMIN — SODIUM CHLORIDE, PRESERVATIVE FREE 10 ML: 5 INJECTION INTRAVENOUS at 08:34

## 2020-11-04 RX ADMIN — ATORVASTATIN CALCIUM 10 MG: 10 TABLET, FILM COATED ORAL at 21:27

## 2020-11-04 RX ADMIN — TRAMADOL HYDROCHLORIDE 50 MG: 50 TABLET, FILM COATED ORAL at 21:41

## 2020-11-04 RX ADMIN — TRAMADOL HYDROCHLORIDE 50 MG: 50 TABLET, FILM COATED ORAL at 12:16

## 2020-11-04 RX ADMIN — SODIUM CHLORIDE 8 MG/HR: 900 INJECTION INTRAVENOUS at 03:25

## 2020-11-04 RX ADMIN — SODIUM CHLORIDE 8 MG/HR: 900 INJECTION INTRAVENOUS at 08:33

## 2020-11-04 RX ADMIN — LEVOTHYROXINE SODIUM ANHYDROUS 37.5 MCG: 100 INJECTION, POWDER, LYOPHILIZED, FOR SOLUTION INTRAVENOUS at 08:32

## 2020-11-04 RX ADMIN — SODIUM CHLORIDE, PRESERVATIVE FREE 10 ML: 5 INJECTION INTRAVENOUS at 21:27

## 2020-11-04 NOTE — PLAN OF CARE
Goal Outcome Evaluation:  Plan of Care Reviewed With: patient  Progress: no change  Outcome Summary: IVF; Protonix GTT; up with assist/cane; Tele; no requests for pain medication; resting between care; will cont to monitor

## 2020-11-04 NOTE — PROGRESS NOTES
[facility] Medicine Services  INPATIENT PROGRESS NOTE    Patient Name: Dominick Robin  Date of Admission: 11/2/2020  Today's Date: 11/04/20  Length of Stay: 2  Primary Care Physician: Curtis Anderson MD    Subjective   Chief Complaint: Follow-up symptomatic anemia/GI bleed    HPI   Patient seen and feels better than yesterday.  Denies any blood per rectum overnight but says she has not had any bowel movements.  No dizziness, chest pain, shortness of breath.  No other acute events or issues noted.  Tolerating p.o.        Review of Systems   All pertinent negatives and positives are as above. All other systems have been reviewed and are negative unless otherwise stated.     Objective    Temp:  [97.9 °F (36.6 °C)-98.9 °F (37.2 °C)] 98.2 °F (36.8 °C)  Heart Rate:  [61-80] 68  Resp:  [16-18] 18  BP: (124-150)/(53-84) 134/82  Physical Exam  GEN: Awake, alert, interactive, in NAD  HEENT:  PERRLA, EOMI, Anicteric, Trachea midline  Lungs: CTAB, no wheezing/rales/rhonchi  Heart: RRR, +S1/s2, no rub  ABD: obese, soft, nt/nd, +BS, no guarding/rebound  Extremities: atraumatic, no cyanosis  Skin: no rashes or lesions  Neuro: AAOx3, no focal deficits  Psych: normal mood & affect        Results Review:  I have reviewed the labs, radiology results, and diagnostic studies.    Laboratory Data:   Results from last 7 days   Lab Units 11/04/20  0858 11/03/20  2207 11/03/20  0648   WBC 10*3/mm3  --   --  4.93   HEMOGLOBIN g/dL 8.9* 9.1* 6.4*   HEMATOCRIT % 28.8* 28.9* 21.4*   PLATELETS 10*3/mm3  --   --  313        Results from last 7 days   Lab Units 11/03/20  0630   SODIUM mmol/L 141   POTASSIUM mmol/L 4.0   CHLORIDE mmol/L 106   CO2 mmol/L 30.0*   BUN mg/dL 11   CREATININE mg/dL 0.52*   CALCIUM mg/dL 8.7   GLUCOSE mg/dL 86       Culture Data:   No results found for: BLOODCX, URINECX, WOUNDCX, MRSACX, RESPCX, STOOLCX    Radiology Data:   Imaging Results (Last 24 Hours)     ** No results found for  the last 24 hours. **          I have reviewed the patient's current medications.     Assessment/Plan     Active Hospital Problems    Diagnosis   • **GI bleed   • Hiatal hernia   • Troy ulcer   • MGUS (monoclonal gammopathy of unknown significance)   • Acute on chronic blood loss anemia   • Morbid obesity with body mass index (BMI) of 50.0 to 59.9 in adult (CMS/[facility])   • COPD (chronic obstructive pulmonary disease) (CMS/[facility])       #1 GI bleed -with a history of Troy ulcers.  Has been on a Protonix drip.  Received 2 units of blood counts have improved.  No obvious bleeding overnight.  No plans for acute surgery at this time and.  Surgery recommends tertiary center follow-up if surgery needed.  Seen by GI and obvious plans for scope.  Change Protonix IV drip to IV twice daily for now.  Have been started on diet.  Continue to monitor bleeding.    #2 acute on chronic blood loss anemia/symptomatic anemia -hemoglobin was 6.4 yesterday morning.  Received 2 units of blood and has since stabilized around 9.  No signs of bleeding overnight.  She also is a history of iron deficiency and MGUS.  Being worked up by hematologist outpatient.  Continue to monitor H&H every 8 hours today.  DC IV fluids.    #3 COPD -not in exacerbation, as needed nebs    #4 hypothyroidism -change IV Synthroid back to p.o.    #5 history of MGUS -as above follows hematology outside of the hospital      Discharge Planning: Continue to monitor the patient for bleeding today.  Expect her to be discharged home tomorrow unless any other recommendations by specialist today if H&H remained stable no further issues or events.  We will follow up.    Electronically signed by Tito Lopez DO, 11/04/20, 10:38 CST.

## 2020-11-04 NOTE — PROGRESS NOTES
RT Nebulizer Protocol    Assessment tool to be used for patients with existing breathing treatments ordered by hospitalists                                                                  0  1  2  3  4      Respiratory History   No Smoking      Smoking History      1 Pack/Day      Pulmonary Disease   x   Exacerbation        Respiratory Rate   Normal   x   20-25      Dyspneic      Accessory Muscles    Severe Dyspnea        Breath Sounds   Clear   x   Crackles      Crackles/ Rhonchi      Wheezing      Absent/ Severe Wheezing        Chest   X-ray   Clear      1 Lobe Infiltration/ Consolidation/ PE   x   2 Lobe Same Lung Infiltration/ Consolidation/ PE       2 Lobe Infiltration/ Both Lungs/ Consolidation/ PE      Both Lungs/ More Than 1 Lobe/ Atelectasis/ Consolidation/  PE        Cough   Strong Non- Productive   x   Excessive Secretions/ Strong Cough      Excessive Secretions/ Weak Cough      Thick Bronchial Secretions/ Weak Cough      Thick Bronchial Secretions/ No Cough        Total Patient Score =   0-4=Q4 PRN  5-9=TID and Q4 PRN  10-14=QID and Q3 PRN  15-19=Q4 and Q2 PRN  20=Q3 and Q2 PRN    Bronchopulmonary Hygiene (CPT)   Q4 ATC Copious secretions, dyspnea, unable to sleep, mucus plug    QID & Q4 PRN Moderate secretions    TID Small amounts of secretions w/ poor cough and history of secretions    BID Unable to deep breathe and cough spontaneously       Lung Expansion Therapy (PEP)   Q4 & PRN at night Severe atelectasis, poor oxygenation    QID  High risk for persistent atelectasis, existence of same    TID At risk for developing atelectasis    BID Unable to deep breathe and cough spontaneously     Instruct, 1 follow up Patients able to perform well on their own        No changes recommeded

## 2020-11-04 NOTE — PROGRESS NOTES
[facility] Gastroenterology  Inpatient Progress Note  Dominick Robin  1960 11/4/2020  Reason for Follow Up:  Iron def anemia with hiatal hernia    Subjective     Subjective:   Pt is AAOx3. No signs for active bleeding. No abdominal pain. No nausea or vomiting. All questions answered.     Current Facility-Administered Medications:   •  albuterol (PROVENTIL) nebulizer solution 0.083% 2.5 mg/3mL, 2.5 mg, Nebulization, Q4H PRN, Rufino Khan MD  •  levothyroxine sodium injection 37.5 mcg, 37.5 mcg, Intravenous, Q AM, Rosario Snyder, APRN, 37.5 mcg at 11/04/20 0832  •  ondansetron (ZOFRAN) tablet 4 mg, 4 mg, Oral, Q6H PRN **OR** ondansetron (ZOFRAN) injection 4 mg, 4 mg, Intravenous, Q6H PRN, Rosario Snyder, APRJODIE  •  [COMPLETED] pantoprazole (PROTONIX) injection 80 mg, 80 mg, Intravenous, Once, 80 mg at 11/03/20 0001 **AND** pantoprazole (PROTONIX) 40 mg in 100mL NS IVPB, 8 mg/hr, Intravenous, Continuous, Rosario Snyder, APRN, Last Rate: 20 mL/hr at 11/04/20 0833, 8 mg/hr at 11/04/20 0833  •  sodium chloride 0.9 % flush 10 mL, 10 mL, Intravenous, Q12H, Rosario Snyder, APRN, 10 mL at 11/04/20 0834  •  sodium chloride 0.9 % flush 10 mL, 10 mL, Intravenous, PRN, Rosario Snyder, APRN  •  sodium chloride 0.9 % infusion, 50 mL/hr, Intravenous, Continuous, Rosario Snyder, APRN, Last Rate: 50 mL/hr at 11/03/20 0955, 50 mL/hr at 11/03/20 0955    Review of Systems:    Review of Systems   Constitutional: Negative for activity change, appetite change, chills, diaphoresis, fatigue, fever and unexpected weight change.   HENT: Negative for ear pain, hearing loss, mouth sores, sore throat, trouble swallowing and voice change.    Eyes: Negative.    Respiratory: Negative for cough, choking, shortness of breath and wheezing.    Cardiovascular: Negative for chest pain and palpitations.   Gastrointestinal: Negative for abdominal pain, blood in stool, constipation, diarrhea, nausea and vomiting.   Endocrine:  Negative for cold intolerance and heat intolerance.   Genitourinary: Negative for decreased urine volume, dysuria, frequency, hematuria and urgency.   Musculoskeletal: Negative for back pain, gait problem and myalgias.   Skin: Negative for color change, pallor and rash.   Allergic/Immunologic: Negative for food allergies and immunocompromised state.   Neurological: Negative for dizziness, tremors, seizures, syncope, weakness, light-headedness, numbness and headaches.   Hematological: Negative for adenopathy. Does not bruise/bleed easily.   Psychiatric/Behavioral: Negative for agitation and confusion. The patient is not nervous/anxious.    All other systems reviewed and are negative.       Objective     Vital Signs  Temp:  [97.9 °F (36.6 °C)-98.9 °F (37.2 °C)] 98.2 °F (36.8 °C)  Heart Rate:  [61-80] 68  Resp:  [16-18] 18  BP: (124-150)/(53-84) 134/82  Body mass index is 46.06 kg/m².    Intake/Output Summary (Last 24 hours) at 11/4/2020 0904  Last data filed at 11/4/2020 0445  Gross per 24 hour   Intake 700 ml   Output 600 ml   Net 100 ml     No intake/output data recorded.       Physical Exam:   General: patient awake, alert and cooperative, no acute distress   Eyes: Normal lids and lashes, no scleral icterus   Neck: supple, no JVD   Skin: warm and dry   Cardiovascular: regular rhythm and rate, no murmurs auscultated   Pulm: clear to auscultation bilaterally, regular and unlabored   Abdomen: soft, nontender, nondistended; normal bowel sounds   Psychiatric: Normal mood and behavior; converses appropriately     Results Review:    I have reviewed all of the patients current test results    Results from last 7 days   Lab Units 11/03/20  2207 11/03/20  0648 11/02/20  2306   WBC 10*3/mm3  --  4.93  --    HEMOGLOBIN g/dL 9.1* 6.4* 6.3*   HEMATOCRIT % 28.9* 21.4* 21.0*   PLATELETS 10*3/mm3  --  313  --        Results from last 7 days   Lab Units 11/03/20  0630   SODIUM mmol/L 141   POTASSIUM mmol/L 4.0   CHLORIDE mmol/L 106    CO2 mmol/L 30.0*   BUN mg/dL 11   CREATININE mg/dL 0.52*   CALCIUM mg/dL 8.7   GLUCOSE mg/dL 86             No results found for: LIPASE    Radiology:    Imaging Results (Last 24 Hours)     ** No results found for the last 24 hours. **            Assessment/Plan     Patient Active Problem List   Diagnosis Code   • GI bleed K92.2   • Symptomatic anemia D64.9   • Acute on chronic blood loss anemia D62   • Morbid obesity with body mass index (BMI) of 50.0 to 59.9 in adult (CMS/[facility]) E66.01, Z68.43   • Lupus erythematosus L93.0   • COPD (chronic obstructive pulmonary disease) (CMS/[facility]) J44.9   • Gastric mass K31.89   • Hiatal hernia K44.9   • Troy ulcer K25.9   • MGUS (monoclonal gammopathy of unknown significance) D47.2   • Sleep apnea G47.30       1. Iron def anemia with hiatal hernia and Troy Lesions    Surgery following and note reviewed. Referral back to a tertiary care center if surgery to be considered    EMR Dragon/transcription disclaimer: Much of this encounter note is electronic transcription/translation of spoken language to printed text. The electronic translation of spoken language may be erroneous, or at times, nonsensical words or phrases may be inadvertently transcribed. Although I have reviewed the note for such errors, some may still exist.    Sangeetha Mullen, APRN  11/04/20  09:04 CST    Chronic anemia related to Troy lesions associated with a large hiatal hernia.  Nothing to add from a GI standpoint.

## 2020-11-04 NOTE — PROGRESS NOTES
Katelynn Mcnulty MD Progress Note     LOS: 2 days   Patient Care Team:  Curtis Anderson MD as PCP - General (Obstetrics and Gynecology)        Subjective     Tolerating regular diet without nausea vomiting.  She reports no blood in her stool.  H&H is essentially stable    Objective     Vital Signs  Temp:  [97.9 °F (36.6 °C)-98.9 °F (37.2 °C)] 98.1 °F (36.7 °C)  Heart Rate:  [61-80] 65  Resp:  [16-18] 16  BP: (124-150)/(53-84) 136/69    Intake & Output (last 3 days)       11/01 0701 - 11/02 0700 11/02 0701 - 11/03 0700 11/03 0701 - 11/04 0700 11/04 0701 - 11/05 0700    Blood  425 600     IV Piggyback   100     Total Intake(mL/kg)  425 (3.6) 700 (5.9)     Urine (mL/kg/hr)   1200 (0.4)     Total Output   1200     Net  +425 -500                   Physical Exam:     General Appearance:    Alert, cooperative, in no acute distress   Lungs:     respirations regular, even and unlabored    Heart:    Regular rhythm and normal rate, normal S1 and S2, no            murmur, no gallop, no rub   Chest Wall:    No abnormalities observed   Abdomen:      Nontender   Extremities: No edema,    Results Review:     I reviewed the patient's new clinical results.    Lab Results (last 72 hours)     Procedure Component Value Units Date/Time    Hemoglobin & Hematocrit, Blood [[id number]]  (Abnormal) Collected: 11/04/20 0858    Specimen: Blood Updated: 11/04/20 0909     Hemoglobin 8.9 g/dL      Hematocrit 28.8 %     Hemoglobin & Hematocrit, Blood [[id number]]  (Abnormal) Collected: 11/03/20 2207    Specimen: Blood Updated: 11/03/20 2233     Hemoglobin 9.1 g/dL      Hematocrit 28.9 %     Vitamin B12 [[id number]]  (Abnormal) Collected: 11/03/20 0630    Specimen: Blood Updated: 11/03/20 2008     Vitamin B-12 1,047 pg/mL     Narrative:      Results may be falsely increased if patient taking Biotin.      Hemoglobin A1c [[id number]]  (Normal) Collected: 11/03/20 0630    Specimen: Blood Updated: 11/03/20 0813     Hemoglobin A1C 4.90 %     Narrative:       Hemoglobin A1C Ranges:    Increased Risk for Diabetes  5.7% to 6.4%  Diabetes                     >= 6.5%  Diabetic Goal                < 7.0%    TSH [[id number]]  (Abnormal) Collected: 11/03/20 0630    Specimen: Blood Updated: 11/03/20 0725     TSH 5.800 uIU/mL     Lipid Panel [[id number]] Collected: 11/03/20 0630    Specimen: Blood Updated: 11/03/20 0722     Total Cholesterol 145 mg/dL      Triglycerides 60 mg/dL      HDL Cholesterol 60 mg/dL      LDL Cholesterol  73 mg/dL      VLDL Cholesterol 12 mg/dL      LDL/HDL Ratio 1.22    Narrative:      Cholesterol Reference Ranges  (U.S. Department of Health and Human Services ATP III Classifications)    Desirable          <200 mg/dL  Borderline High    200-239 mg/dL  High Risk          >240 mg/dL      Triglyceride Reference Ranges  (U.S. Department of Health and Human Services ATP III Classifications)    Normal           <150 mg/dL  Borderline High  150-199 mg/dL  High             200-499 mg/dL  Very High        >500 mg/dL    HDL Reference Ranges  (U.S. Department of Health and Human Services ATP III Classifcations)    Low     <40 mg/dl (major risk factor for CHD)  High    >60 mg/dl ('negative' risk factor for CHD)        LDL Reference Ranges  (U.S. Department of Health and Human Services ATP III Classifcations)    Optimal          <100 mg/dL  Near Optimal     100-129 mg/dL  Borderline High  130-159 mg/dL  High             160-189 mg/dL  Very High        >189 mg/dL    Basic Metabolic Panel [[id number]]  (Abnormal) Collected: 11/03/20 0630    Specimen: Blood Updated: 11/03/20 0717     Glucose 86 mg/dL      BUN 11 mg/dL      Creatinine 0.52 mg/dL      Sodium 141 mmol/L      Potassium 4.0 mmol/L      Chloride 106 mmol/L      CO2 30.0 mmol/L      Calcium 8.7 mg/dL      eGFR  African Amer 147 mL/min/1.73      BUN/Creatinine Ratio 21.2     Anion Gap 5.0 mmol/L     Narrative:      GFR Normal >60  Chronic Kidney Disease <60  Kidney Failure <15      CBC Auto Differential  [[id number]]  (Abnormal) Collected: 11/03/20 0648    Specimen: Blood Updated: 11/03/20 0701     WBC 4.93 10*3/mm3      RBC 2.54 10*6/mm3      Hemoglobin 6.4 g/dL      Hematocrit 21.4 %      MCV 84.3 fL      MCH 25.2 pg      MCHC 29.9 g/dL      RDW 15.3 %      RDW-SD 46.7 fl      MPV 9.9 fL      Platelets 313 10*3/mm3      Neutrophil % 68.0 %      Lymphocyte % 13.8 %      Monocyte % 14.4 %      Eosinophil % 2.0 %      Basophil % 1.0 %      Immature Grans % 0.8 %      Neutrophils, Absolute 3.35 10*3/mm3      Lymphocytes, Absolute 0.68 10*3/mm3      Monocytes, Absolute 0.71 10*3/mm3      Eosinophils, Absolute 0.10 10*3/mm3      Basophils, Absolute 0.05 10*3/mm3      Immature Grans, Absolute 0.04 10*3/mm3      nRBC 0.0 /100 WBC     Iron Profile [[id number]]  (Abnormal) Collected: 11/02/20 2306    Specimen: Blood Updated: 11/03/20 0008     Iron 18 mcg/dL      Iron Saturation 4 %      Transferrin 341 mg/dL      TIBC 508 mcg/dL     Hemoglobin & Hematocrit, Blood [[id number]]  (Abnormal) Collected: 11/02/20 2306    Specimen: Blood Updated: 11/02/20 2344     Hemoglobin 6.3 g/dL      Hematocrit 21.0 %         Imaging Results (Last 72 Hours)     Procedure Component Value Units Date/Time    XR Chest 1 View [[id number]] Collected: 11/03/20 0713     Updated: 11/03/20 0719    Narrative:      Frontal upright radiograph of the chest 11/3/2020 4:10 AM CST     HISTORY: COPD     COMPARISON: None.     FINDINGS:      6 mm left apical calcified granuloma. Minimal atelectasis at the right  costophrenic angle. No pleural effusion or pneumothorax. Cardiomegaly is  present. Mild central pulmonary congestion. No septal thickening or  subpleural Kerley B lines. Right chest wall Lhaver-c-Eowm. No acute bony  abnormality.        Impression:      1. Cardiomegaly with mild central vascular congestion. No consolidation  or pulmonary edema.     This report was finalized on 11/03/2020 07:15 by Dr Oren Sandhu, .              Assessment/Plan       GI  bleed    Acute on chronic blood loss anemia    Morbid obesity with body mass index (BMI) of 50.0 to 59.9 in adult (CMS/HCC)    COPD (chronic obstructive pulmonary disease) (CMS/HCC)    Hiatal hernia    Troy ulcer    MGUS (monoclonal gammopathy of unknown significance)      Sister doing okay.  Given her extensive comorbidities and difficulty with her hernia I feel that it be best treated at [facility] in an [facility] setting.  I do not see the need for emergent intervention.  He has been dealing with this for very long time.  I saw her back in January and she been struggling with it then.      Katelynn Mcnulty MD  11/04/20  11:41 CST

## 2020-11-04 NOTE — CONSULTS
Katelynn Mcnulty MD Consult Note      Patient Care Team:  Curtis Anderson MD as PCP - General (Obstetrics and Gynecology)    Chief complaint hiatal hernia    Subjective .     History of present illness: I saw this patient back in January and [facility].  She has multiple complaints.  She was transferred here because of anemia and GI bleeding..  [facility].  She was transferred here to the medicine service and I been asked to evaluate.  I reviewed GIs notes as well.  I reviewed my note from the office in January.  She is hemodynamically stable    Review of Systems  Pertinent items are noted in HPI, all other systems reviewed and negative    History  Past Medical History:   Diagnosis Date   • Anemia    • Anxiety    • Bilateral calcaneal spurs    • COPD (chronic obstructive pulmonary disease) (CMS/HCC)    • Disease of thyroid gland    • Diverticulitis    • Dry eyes    • Fibromyalgia    • Headache    • Heart failure (CMS/HCC)    • Hiatal hernia    • History of GI bleed    • Hyperlipidemia    • Hypertension    • Lupus (CMS/HCC)    • Sinusitis    ,   Past Surgical History:   Procedure Laterality Date   • ABDOMINAL SURGERY     • ENDOSCOPY N/A 9/10/2019    Procedure: ESOPHAGOGASTRODUODENOSCOPY WITH ANESTHESIA;  Surgeon: Ilya Cope DO;  Location: [facility] ENDOSCOPY;  Service: Gastroenterology   • LEG SURGERY     ,   Family History   Problem Relation Age of Onset   • Dementia Mother    • Prostate cancer Father    ,   Social History     Tobacco Use   • Smoking status: Never Smoker   • Smokeless tobacco: Never Used   Substance Use Topics   • Alcohol use: Never     Frequency: Never   • Drug use: Never   ,   Medications Prior to Admission   Medication Sig Dispense Refill Last Dose   • albuterol (PROVENTIL) (2.5 MG/3ML) 0.083% nebulizer solution Take 2.5 mg by nebulization Every 4 (Four) Hours As Needed for Wheezing.   Past Week at Unknown time   • Cholecalciferol  2000 units tablet Take 2,000 Units by mouth Daily. 2 tablets once daily   11/2/2020 at Unknown time   • furosemide (LASIX) 40 MG tablet Take 20 mg by mouth Daily As Needed. Take half tablet every morning as needed   Past Week at Unknown time   • guaiFENesin (ROBITUSSIN) 100 MG/5ML solution oral solution Take 100 mg by mouth Every 6 (Six) Hours As Needed.      • ipratropium-albuterol (COMBIVENT RESPIMAT)  MCG/ACT inhaler Inhale 1 puff 4 (Four) Times a Day As Needed for Wheezing.   Past Month at Unknown time   • isosorbide mononitrate (IMDUR) 60 MG 24 hr tablet Take 120 mg by mouth Daily. Take one and 1/2 tablets by mouth once daily   11/2/2020 at Unknown time   • LACTOBACILLUS PO Take 1 tablet by mouth Daily. Take one by mouth two times daily   11/2/2020 at Unknown time   • levothyroxine (SYNTHROID, LEVOTHROID) 75 MCG tablet Take 175 mcg by mouth Daily.   11/1/2020 at Unknown time   • Liraglutide -Weight Management (SAXENDA SC) Inject 6 mg/mL under the skin into the appropriate area as directed. Take 0.5mL once daily subcutaneou   11/2/2020 at Unknown time   • loratadine (CLARITIN) 10 MG tablet Take 10 mg by mouth Daily.   11/2/2020 at Unknown time   • MENTHOL-METHYL SALICYLATE EX Apply 1 application topically Daily As Needed.      • methocarbamol (ROBAXIN) 500 MG tablet Take 500 mg by mouth 2 (Two) Times a Day As Needed for Muscle Spasms. Take 1-2 tablets by mouth 2 times daily as needed   Past Week at Unknown time   • Multiple Vitamins-Minerals (MULTIVITAMIN WITH MINERALS) tablet tablet Take 1 tablet by mouth Daily.   11/2/2020 at Unknown time   • Omega-3 Fatty Acids (FISH OIL) 1000 MG capsule capsule Take  by mouth Daily With Breakfast.   11/2/2020 at Unknown time   • potassium chloride (KLOR-CON) 20 MEQ packet Take 20 mEq by mouth Daily.   11/2/2020 at Unknown time   • simvastatin (ZOCOR) 20 MG tablet Take 20 mg by mouth Every Night. Take 1/2 tablet by mouth nightly   11/1/2020 at Unknown time   • traMADol  (ULTRAM) 50 MG tablet Take 50 mg by mouth 3 (Three) Times a Day As Needed for Moderate Pain .   11/2/2020 at Unknown time   • ferrous sulfate 325 (65 FE) MG tablet Take 1 tablet by mouth Daily With Breakfast. (Patient not taking: Reported on 11/2/2020) 30 tablet 0 Not Taking at Unknown time   , Scheduled Meds:  levothyroxine sodium, 37.5 mcg, Intravenous, Q AM  sodium chloride, 10 mL, Intravenous, Q12H    , Continuous Infusions:  pantoprazole, 8 mg/hr, Last Rate: 8 mg/hr (11/03/20 1732)  sodium chloride, 50 mL/hr, Last Rate: 50 mL/hr (11/03/20 0955)    , PRN Meds:  •  albuterol  •  ondansetron **OR** ondansetron  •  sodium chloride and Allergies:  Motrin [ibuprofen] and Prednisone    Objective     Vital Signs   Temp:  [97.8 °F (36.6 °C)-98.8 °F (37.1 °C)] 98.5 °F (36.9 °C)  Heart Rate:  [64-80] 68  Resp:  [16-18] 16  BP: (102-143)/(56-97) 142/84    Intake & Output (last 3 days)       10/31 0701 - 11/01 0700 11/01 0701 - 11/02 0700 11/02 0701 - 11/03 0700 11/03 0701 - 11/04 0700    Blood   425 310    IV Piggyback    100    Total Intake(mL/kg)   425 (3.6) 410 (3.5)    Urine (mL/kg/hr)    900 (0.7)    Total Output    900    Net   +425 -490                   Physical Exam:     General Appearance:    Alert, cooperative, in no acute distress   Head:    Normocephalic, without obvious abnormality, atraumatic   Eyes:            Lids and lashes normal, conjunctivae and sclerae normal, no   icterus, no pallor, corneas clear, PERRLA   Ears:    Ears appear intact with no abnormalities noted   Neck:   No adenopathy, supple, trachea midline   Back:     No kyphosis present, no scoliosis present, no skin lesions,      erythema or scars, no tenderness to percussion or                   palpation,  range of motion normal   Lungs:     Clear to auscultation,respirations regular, even and                  unlabored    Heart:    Regular rhythm and normal rate, normal S1 and S2, no            murmur, no gallop, no rub, no click   Chest  Wall:    No abnormalities observed   Abdomen:    Obese nontender   Rectal:     Deferred   Extremities:   Moves all extremities well, no edema, no cyanosis, no             redness   Pulses:   Pulses palpable and equal bilaterally   Skin:   No bleeding, bruising or rash   Lymph nodes:   No palpable adenopathy   Neurologic:   No focal deficits       Lab Results (last 72 hours)     Procedure Component Value Units Date/Time    Hemoglobin A1c [[id number]]  (Normal) Collected: 11/03/20 0630    Specimen: Blood Updated: 11/03/20 0813     Hemoglobin A1C 4.90 %     Narrative:      Hemoglobin A1C Ranges:    Increased Risk for Diabetes  5.7% to 6.4%  Diabetes                     >= 6.5%  Diabetic Goal                < 7.0%    TSH [[id number]]  (Abnormal) Collected: 11/03/20 0630    Specimen: Blood Updated: 11/03/20 0725     TSH 5.800 uIU/mL     Lipid Panel [[id number]] Collected: 11/03/20 0630    Specimen: Blood Updated: 11/03/20 0722     Total Cholesterol 145 mg/dL      Triglycerides 60 mg/dL      HDL Cholesterol 60 mg/dL      LDL Cholesterol  73 mg/dL      VLDL Cholesterol 12 mg/dL      LDL/HDL Ratio 1.22    Narrative:      Cholesterol Reference Ranges  (U.S. Department of Health and Human Services ATP III Classifications)    Desirable          <200 mg/dL  Borderline High    200-239 mg/dL  High Risk          >240 mg/dL      Triglyceride Reference Ranges  (U.S. Department of Health and Human Services ATP III Classifications)    Normal           <150 mg/dL  Borderline High  150-199 mg/dL  High             200-499 mg/dL  Very High        >500 mg/dL    HDL Reference Ranges  (U.S. Department of Health and Human Services ATP III Classifcations)    Low     <40 mg/dl (major risk factor for CHD)  High    >60 mg/dl ('negative' risk factor for CHD)        LDL Reference Ranges  (U.S. Department of Health and Human Services ATP III Classifcations)    Optimal          <100 mg/dL  Near Optimal     100-129 mg/dL  Borderline High  130-159  mg/dL  High             160-189 mg/dL  Very High        >189 mg/dL    Basic Metabolic Panel [[id number]]  (Abnormal) Collected: 11/03/20 0630    Specimen: Blood Updated: 11/03/20 0717     Glucose 86 mg/dL      BUN 11 mg/dL      Creatinine 0.52 mg/dL      Sodium 141 mmol/L      Potassium 4.0 mmol/L      Chloride 106 mmol/L      CO2 30.0 mmol/L      Calcium 8.7 mg/dL      eGFR  African Amer 147 mL/min/1.73      BUN/Creatinine Ratio 21.2     Anion Gap 5.0 mmol/L     Narrative:      GFR Normal >60  Chronic Kidney Disease <60  Kidney Failure <15      CBC Auto Differential [[id number]]  (Abnormal) Collected: 11/03/20 0648    Specimen: Blood Updated: 11/03/20 0701     WBC 4.93 10*3/mm3      RBC 2.54 10*6/mm3      Hemoglobin 6.4 g/dL      Hematocrit 21.4 %      MCV 84.3 fL      MCH 25.2 pg      MCHC 29.9 g/dL      RDW 15.3 %      RDW-SD 46.7 fl      MPV 9.9 fL      Platelets 313 10*3/mm3      Neutrophil % 68.0 %      Lymphocyte % 13.8 %      Monocyte % 14.4 %      Eosinophil % 2.0 %      Basophil % 1.0 %      Immature Grans % 0.8 %      Neutrophils, Absolute 3.35 10*3/mm3      Lymphocytes, Absolute 0.68 10*3/mm3      Monocytes, Absolute 0.71 10*3/mm3      Eosinophils, Absolute 0.10 10*3/mm3      Basophils, Absolute 0.05 10*3/mm3      Immature Grans, Absolute 0.04 10*3/mm3      nRBC 0.0 /100 WBC     Vitamin B12 [[id number]] Collected: 11/03/20 0630    Specimen: Blood Updated: 11/03/20 0648    Iron Profile [[id number]]  (Abnormal) Collected: 11/02/20 2306    Specimen: Blood Updated: 11/03/20 0008     Iron 18 mcg/dL      Iron Saturation 4 %      Transferrin 341 mg/dL      TIBC 508 mcg/dL     Hemoglobin & Hematocrit, Blood [[id number]]  (Abnormal) Collected: 11/02/20 2306    Specimen: Blood Updated: 11/02/20 2344     Hemoglobin 6.3 g/dL      Hematocrit 21.0 %         Imaging Results (Last 72 Hours)     Procedure Component Value Units Date/Time    XR Chest 1 View [[id number]] Collected: 11/03/20 0713     Updated: 11/03/20 0719     Narrative:      Frontal upright radiograph of the chest 11/3/2020 4:10 AM CST     HISTORY: COPD     COMPARISON: None.     FINDINGS:      6 mm left apical calcified granuloma. Minimal atelectasis at the right  costophrenic angle. No pleural effusion or pneumothorax. Cardiomegaly is  present. Mild central pulmonary congestion. No septal thickening or  subpleural Kerley B lines. Right chest wall [id number]. No acute bony  abnormality.        Impression:      1. Cardiomegaly with mild central vascular congestion. No consolidation  or pulmonary edema.     This report was finalized on 11/03/2020 07:15 by Dr Oren Sandhu, .                Assessment/Plan       GI bleed    Acute on chronic blood loss anemia    Morbid obesity with body mass index (BMI) of 50.0 to 59.9 in adult (CMS/HCC)    Lupus erythematosus    COPD (chronic obstructive pulmonary disease) (CMS/HCC)    Hiatal hernia    Troy ulcer    MGUS (monoclonal gammopathy of unknown significance)    Sleep apnea      Is a very difficult situation with her significant comorbidities, obesity, hepatomegaly on CT scan.  I have discussed with her this operation in the past and again today.  We does not appear to be an urgent emergent need for repair at this time.  Certainly ulcers could be causing it but she does have hematologic disorders as well as lupus and fibromyalgia is.  She may benefit from tertiary care referral after her work-up is completed.      Katelynn Mcnulty MD  11/03/20  18:13 CST

## 2020-11-05 VITALS
BODY MASS INDEX: 46.07 KG/M2 | HEART RATE: 66 BPM | HEIGHT: 63 IN | WEIGHT: 260 LBS | DIASTOLIC BLOOD PRESSURE: 45 MMHG | OXYGEN SATURATION: 93 % | RESPIRATION RATE: 18 BRPM | SYSTOLIC BLOOD PRESSURE: 104 MMHG | TEMPERATURE: 97.6 F

## 2020-11-05 LAB
HCT VFR BLD AUTO: 29.6 % (ref 34–46.6)
HCT VFR BLD AUTO: 30.3 % (ref 34–46.6)
HGB BLD-MCNC: 9 G/DL (ref 12–15.9)
HGB BLD-MCNC: 9.1 G/DL (ref 12–15.9)

## 2020-11-05 PROCEDURE — 85014 HEMATOCRIT: CPT | Performed by: INTERNAL MEDICINE

## 2020-11-05 PROCEDURE — 63710000001 ONDANSETRON PER 8 MG: Performed by: NURSE PRACTITIONER

## 2020-11-05 PROCEDURE — 85018 HEMOGLOBIN: CPT | Performed by: INTERNAL MEDICINE

## 2020-11-05 RX ORDER — PANTOPRAZOLE SODIUM 40 MG/1
40 TABLET, DELAYED RELEASE ORAL DAILY
Qty: 30 TABLET | Refills: 0 | Status: SHIPPED | OUTPATIENT
Start: 2020-11-05 | End: 2020-12-05

## 2020-11-05 RX ADMIN — LEVOTHYROXINE SODIUM 175 MCG: 150 TABLET ORAL at 04:31

## 2020-11-05 RX ADMIN — TRAMADOL HYDROCHLORIDE 50 MG: 50 TABLET, FILM COATED ORAL at 08:50

## 2020-11-05 RX ADMIN — ISOSORBIDE MONONITRATE 120 MG: 60 TABLET, EXTENDED RELEASE ORAL at 08:53

## 2020-11-05 RX ADMIN — SODIUM CHLORIDE, PRESERVATIVE FREE 10 ML: 5 INJECTION INTRAVENOUS at 08:53

## 2020-11-05 RX ADMIN — ONDANSETRON HYDROCHLORIDE 4 MG: 4 TABLET, FILM COATED ORAL at 04:30

## 2020-11-05 RX ADMIN — PANTOPRAZOLE SODIUM 40 MG: 40 INJECTION, POWDER, FOR SOLUTION INTRAVENOUS at 08:53

## 2020-11-05 NOTE — DISCHARGE SUMMARY
[facility] Medicine Services  DISCHARGE SUMMARY       Date of Admission: 11/2/2020  Date of Discharge:  11/5/2020  Primary Care Physician: Curtis Anderson MD    Presenting Problem/History of Present Illness:  Acute on chronic blood loss anemia [D62]     Final Discharge Diagnoses:  Active Hospital Problems    Diagnosis   • **GI bleed   • Hiatal hernia   • Troy ulcer   • MGUS (monoclonal gammopathy of unknown significance)   • Acute on chronic blood loss anemia   • Morbid obesity with body mass index (BMI) of 50.0 to 59.9 in adult (CMS/[facility])   • COPD (chronic obstructive pulmonary disease) (CMS/[facility])       Consults:   #1 Dr. Mcnulty, surgery  #2 Dr. Larios, GI    Procedures Performed: none    Pertinent Test Results:   none    Chief Complaint on Day of Discharge: f/u symptomatic anemia    History of Present Illness on Day of Discharge:    Patient seen and examined.  Feeling well.  No dizziness, chest pain, shortness of breath.  Energy levels improved.  No rectal or GI bleeding noted.    Hospital Course:  The patient is a 60 y[facility]. [facility]  She has a history of chronic anemia.  Hiatal hernia.  Troy lesions.  Also has a history of MGUS and follows with hematology.  [facility] due to some shortness of breath and anemia with a hemoglobin of 6.4.  Was apparently 9.8 in August 2020.  She chronically is on iron and is iron deficient.  On arrival here her hemoglobin was 6.4.  She was given 2 units of PRBC transfusion and her hemoglobin came up to 9.1 and stayed stable at that point.  She had no evidence of GI bleeding while here.  Her hemoglobin on discharge is 9.0.  She felt much better after blood transfusion.  No chest pain, dizziness, shortness of breath.  She was seen by her general surgery team who felt no emergent interventions were necessary and [facility] with [facility]  "to tertiary center for definitive management and surgery of her hiatal hernia and Troy lesions.  She was seen by GI here but again no interventions performed.  They had no new recommendations.  She has been on a PPI.  We will continue this at discharge.  Otherwise stable will discharge home today.  Follow-up as an outpatient.    Condition on Discharge: Able/improved    Physical Exam on Discharge:  /53 (BP Location: Left arm, Patient Position: Lying)   Pulse 58   Temp 97.1 °F (36.2 °C) (Oral)   Resp 18   Ht 160 cm (63\")   Wt 118 kg (260 lb)   SpO2 93%   Breastfeeding No   BMI 46.06 kg/m²   Physical Exam  GEN: Awake, alert, interactive, in NAD  HEENT:  PERRLA, EOMI, Anicteric, Trachea midline  Lungs: CTAB, no wheezing/rales/rhonchi  Heart: RRR, +S1/s2, no rub  ABD: obese, soft, nt/nd, +BS, no guarding/rebound  Extremities: atraumatic, no cyanosis  Skin: no rashes or lesions  Neuro: AAOx3, no focal deficits  Psych: normal mood & affect    Discharge Disposition:  Home or Self Care    Discharge Medications:     Discharge Medications      New Medications      Instructions Start Date   pantoprazole 40 MG EC tablet  Commonly known as: Protonix   40 mg, Oral, Daily         Continue These Medications      Instructions Start Date   albuterol (2.5 MG/3ML) 0.083% nebulizer solution  Commonly known as: PROVENTIL   2.5 mg, Nebulization, Every 4 Hours PRN      albuterol sulfate  (90 Base) MCG/ACT inhaler  Commonly known as: PROVENTIL HFA;VENTOLIN HFA;PROAIR HFA   2 puffs, Inhalation, Every 4 Hours PRN      Cholecalciferol 50 MCG (2000 UT) tablet   4,000 Units, Oral, Daily, 2 tablets once daily      ferrous sulfate 325 (65 FE) MG tablet   325 mg, Oral, Daily With Breakfast      fish oil 1000 MG capsule capsule   1,000 mg, Oral, 2 Times Daily With Meals      furosemide 20 MG tablet  Commonly known as: LASIX   20 mg, Oral, Daily PRN      guaiFENesin 100 MG/5ML solution oral solution  Commonly known as: " ROBITUSSIN   100 mg, Oral, Every 6 Hours PRN      ipratropium-albuterol  MCG/ACT inhaler  Commonly known as: COMBIVENT RESPIMAT   1 puff, Inhalation, 4 Times Daily PRN      isosorbide mononitrate 120 MG 24 hr tablet  Commonly known as: IMDUR   120 mg, Oral, Daily      LACTOBACILLUS PO   1 tablet, Oral, Daily      levothyroxine 75 MCG tablet  Commonly known as: SYNTHROID, LEVOTHROID   175 mcg, Oral, Daily      loratadine 10 MG tablet  Commonly known as: CLARITIN   10 mg, Oral, Daily      MENTHOL-METHYL SALICYLATE EX   1 application, Apply externally, Daily PRN      methocarbamol 500 MG tablet  Commonly known as: ROBAXIN   500 mg, Oral, 2 Times Daily PRN, Take 1-2 tablets by mouth 2 times daily as needed      multivitamin with minerals tablet tablet   1 tablet, Oral, Daily      potassium chloride 20 MEQ packet  Commonly known as: KLOR-CON   20 mEq, Oral, Daily      SAXENDA SC   3 mg, Subcutaneous, Take 0.5mL once daily subcutaneou      simvastatin 20 MG tablet  Commonly known as: ZOCOR   10 mg, Oral, Nightly      traMADol 50 MG tablet  Commonly known as: ULTRAM   50 mg, Oral, 3 Times Daily PRN             Discharge Diet:    Dietary Orders (From admission, onward)     Start     Ordered    11/03/20 1839  Diet Regular; GI Soft  Diet Effective Now     Question Answer Comment   Diet Texture / Consistency Regular    Common Modifiers GI Soft        11/03/20 1838                Activity at Discharge: As prior    Discharge Care Plan/Instructions: PCP and hematology.  Recommend referral to tertiary center for definitive GI/hiatal hernia surgery.    Follow-up Appointments:   No future appointments.    Test Results Pending at Discharge: None    Electronically signed by Tito Lopez DO, 11/05/20, 12:05 CST.    Time: 33 minutes

## 2020-11-05 NOTE — PROGRESS NOTES
Katelynn Mcnulty MD Progress Note     LOS: 3 days   Patient Care Team:  Curtis Anderson MD as PCP - General (Obstetrics and Gynecology)        Subjective     Had some nausea this morning.  No bleeding per rectum no vomiting    Objective     Vital Signs  Temp:  [97.8 °F (36.6 °C)-98.5 °F (36.9 °C)] 97.8 °F (36.6 °C)  Heart Rate:  [65-76] 69  Resp:  [16-18] 18  BP: (133-154)/(57-89) 141/57    Intake & Output (last 3 days)       11/02 0701 - 11/03 0700 11/03 0701 - 11/04 0700 11/04 0701 - 11/05 0700 11/05 0701 - 11/06 0700    Blood 425 600      IV Piggyback  100      Total Intake(mL/kg) 425 (3.6) 700 (5.9)      Urine (mL/kg/hr)  1200 (0.4)      Stool   0     Total Output  1200 0     Net +425 -500 0             Stool Unmeasured Occurrence   1 x           Physical Exam:     General Appearance:    Alert, cooperative, in no acute distress   Lungs:     respirations regular, even and unlabored    Heart:    Regular rhythm and normal rate, normal S1 and S2, no            murmur, no gallop, no rub   Chest Wall:    No abnormalities observed   Abdomen:      Soft no tenderness   Extremities: No edema,    Results Review:     I reviewed the patient's new clinical results.    Lab Results (last 72 hours)     Procedure Component Value Units Date/Time    Hemoglobin & Hematocrit, Blood [[id number]]  (Abnormal) Collected: 11/04/20 2345    Specimen: Blood Updated: 11/05/20 0036     Hemoglobin 9.1 g/dL      Hematocrit 30.3 %     Hemoglobin & Hematocrit, Blood [[id number]]  (Abnormal) Collected: 11/04/20 1555    Specimen: Blood Updated: 11/04/20 1601     Hemoglobin 8.9 g/dL      Hematocrit 28.8 %     Occult Blood X 1, Stool - Stool, Per Rectum [[id number]]  (Abnormal) Collected: 11/04/20 1544    Specimen: Stool from Per Rectum Updated: 11/04/20 1550     Fecal Occult Blood Positive    SCANNED - LABS [[id number]] Resulted: 11/02/20      Updated: 11/04/20 1200    Hemoglobin & Hematocrit, Blood [[id number]]  (Abnormal) Collected: 11/04/20 0858     Specimen: Blood Updated: 11/04/20 0909     Hemoglobin 8.9 g/dL      Hematocrit 28.8 %     Hemoglobin & Hematocrit, Blood [[id number]]  (Abnormal) Collected: 11/03/20 2207    Specimen: Blood Updated: 11/03/20 2233     Hemoglobin 9.1 g/dL      Hematocrit 28.9 %     Vitamin B12 [[id number]]  (Abnormal) Collected: 11/03/20 0630    Specimen: Blood Updated: 11/03/20 2008     Vitamin B-12 1,047 pg/mL     Narrative:      Results may be falsely increased if patient taking Biotin.      Hemoglobin A1c [[id number]]  (Normal) Collected: 11/03/20 0630    Specimen: Blood Updated: 11/03/20 0813     Hemoglobin A1C 4.90 %     Narrative:      Hemoglobin A1C Ranges:    Increased Risk for Diabetes  5.7% to 6.4%  Diabetes                     >= 6.5%  Diabetic Goal                < 7.0%    TSH [[id number]]  (Abnormal) Collected: 11/03/20 0630    Specimen: Blood Updated: 11/03/20 0725     TSH 5.800 uIU/mL     Lipid Panel [[id number]] Collected: 11/03/20 0630    Specimen: Blood Updated: 11/03/20 0722     Total Cholesterol 145 mg/dL      Triglycerides 60 mg/dL      HDL Cholesterol 60 mg/dL      LDL Cholesterol  73 mg/dL      VLDL Cholesterol 12 mg/dL      LDL/HDL Ratio 1.22    Narrative:      Cholesterol Reference Ranges  (U.S. Department of Health and Human Services ATP III Classifications)    Desirable          <200 mg/dL  Borderline High    200-239 mg/dL  High Risk          >240 mg/dL      Triglyceride Reference Ranges  (U.S. Department of Health and Human Services ATP III Classifications)    Normal           <150 mg/dL  Borderline High  150-199 mg/dL  High             200-499 mg/dL  Very High        >500 mg/dL    HDL Reference Ranges  (U.S. Department of Health and Human Services ATP III Classifcations)    Low     <40 mg/dl (major risk factor for CHD)  High    >60 mg/dl ('negative' risk factor for CHD)        LDL Reference Ranges  (U.S. Department of Health and Human Services ATP III Classifcations)    Optimal          <100 mg/dL  Near  Optimal     100-129 mg/dL  Borderline High  130-159 mg/dL  High             160-189 mg/dL  Very High        >189 mg/dL    Basic Metabolic Panel [[id number]]  (Abnormal) Collected: 11/03/20 0630    Specimen: Blood Updated: 11/03/20 0717     Glucose 86 mg/dL      BUN 11 mg/dL      Creatinine 0.52 mg/dL      Sodium 141 mmol/L      Potassium 4.0 mmol/L      Chloride 106 mmol/L      CO2 30.0 mmol/L      Calcium 8.7 mg/dL      eGFR  African Amer 147 mL/min/1.73      BUN/Creatinine Ratio 21.2     Anion Gap 5.0 mmol/L     Narrative:      GFR Normal >60  Chronic Kidney Disease <60  Kidney Failure <15      CBC Auto Differential [[id number]]  (Abnormal) Collected: 11/03/20 0648    Specimen: Blood Updated: 11/03/20 0701     WBC 4.93 10*3/mm3      RBC 2.54 10*6/mm3      Hemoglobin 6.4 g/dL      Hematocrit 21.4 %      MCV 84.3 fL      MCH 25.2 pg      MCHC 29.9 g/dL      RDW 15.3 %      RDW-SD 46.7 fl      MPV 9.9 fL      Platelets 313 10*3/mm3      Neutrophil % 68.0 %      Lymphocyte % 13.8 %      Monocyte % 14.4 %      Eosinophil % 2.0 %      Basophil % 1.0 %      Immature Grans % 0.8 %      Neutrophils, Absolute 3.35 10*3/mm3      Lymphocytes, Absolute 0.68 10*3/mm3      Monocytes, Absolute 0.71 10*3/mm3      Eosinophils, Absolute 0.10 10*3/mm3      Basophils, Absolute 0.05 10*3/mm3      Immature Grans, Absolute 0.04 10*3/mm3      nRBC 0.0 /100 WBC     Iron Profile [[id number]]  (Abnormal) Collected: 11/02/20 2306    Specimen: Blood Updated: 11/03/20 0008     Iron 18 mcg/dL      Iron Saturation 4 %      Transferrin 341 mg/dL      TIBC 508 mcg/dL     Hemoglobin & Hematocrit, Blood [[id number]]  (Abnormal) Collected: 11/02/20 2306    Specimen: Blood Updated: 11/02/20 2344     Hemoglobin 6.3 g/dL      Hematocrit 21.0 %         Imaging Results (Last 72 Hours)     Procedure Component Value Units Date/Time    XR Chest 1 View [[id number]] Collected: 11/03/20 0713     Updated: 11/03/20 0719    Narrative:      Frontal upright radiograph of  the chest 11/3/2020 4:10 AM CST     HISTORY: COPD     COMPARISON: None.     FINDINGS:      6 mm left apical calcified granuloma. Minimal atelectasis at the right  costophrenic angle. No pleural effusion or pneumothorax. Cardiomegaly is  present. Mild central pulmonary congestion. No septal thickening or  subpleural Kerley B lines. Right chest wall Okedua-[id number]-Jozv. No acute bony  abnormality.        Impression:      1. Cardiomegaly with mild central vascular congestion. No consolidation  or pulmonary edema.     This report was finalized on 11/03/2020 07:15 by Dr Oern Sandhu, .              Assessment/Plan       GI bleed    Acute on chronic blood loss anemia    Morbid obesity with body mass index (BMI) of 50.0 to 59.9 in adult (CMS/HCC)    COPD (chronic obstructive pulmonary disease) (CMS/HCC)    Hiatal hernia    Troy ulcer    MGUS (monoclonal gammopathy of unknown significance)      Continue current treatment.  Okay for discharge [facility]  Recommend to them tertiary care referral for her paraesophageal hiatal hernia      Katelynn Mcnulty MD  11/05/20  07:50 CST

## 2020-11-05 NOTE — PLAN OF CARE
Goal Outcome Evaluation:  Plan of Care Reviewed With: patient  Progress: improving  Outcome Summary: No requests for pain medication; medicated for nausea x 1; up with assist/cane; IID; resting between care; will cont to monitor

## 2020-11-05 NOTE — PROGRESS NOTES
RT Nebulizer Protocol    Assessment tool to be used for patients with existing breathing treatments ordered by hospitalists                                                                  0  1  2  3  4      Respiratory History   No Smoking      Smoking History      1 Pack/Day      Pulmonary Disease   x   Exacerbation        Respiratory Rate   Normal   x   20-25      Dyspneic      Accessory Muscles      Severe Dyspnea        Breath Sounds   Clear   x   Crackles      Crackles/ Rhonchi      Wheezing      Absent/ Severe Wheezing        Chest   X-ray   Clear      1 Lobe Infiltration/ Consolidation/ PE      2 Lobe Same Lung Infiltration/ Consolidation/ PE       2 Lobe Infiltration/ Both Lungs/ Consolidation/ PE      Both Lungs/ More Than 1 Lobe/ Atelectasis/ Consolidation/  PE        Cough   Strong Non- Productive   x   Excessive Secretions/ Strong Cough      Excessive Secretions/ Weak Cough      Thick Bronchial Secretions/ Weak Cough      Thick Bronchial Secretions/ No Cough        Total Patient Score =  3  0-4=Q4 PRN  5-9=TID and Q4 PRN  10-14=QID and Q3 PRN  15-19=Q4 and Q2 PRN  20=Q3 and Q2 PRN    Bronchopulmonary Hygiene (CPT)   Q4 ATC Copious secretions, dyspnea, unable to sleep, mucus plug    QID & Q4 PRN Moderate secretions    TID Small amounts of secretions w/ poor cough and history of secretions    BID Unable to deep breathe and cough spontaneously       Lung Expansion Therapy (PEP)   Q4 & PRN at night Severe atelectasis, poor oxygenation    QID  High risk for persistent atelectasis, existence of same    TID At risk for developing atelectasis    BID Unable to deep breathe and cough spontaneously     Instruct, 1 follow up Patients able to perform well on their own      No changes in respiratory therapy

## 2020-11-06 ENCOUNTER — READMISSION MANAGEMENT (OUTPATIENT)
Dept: CALL CENTER | Facility: HOSPITAL | Age: 60
End: 2020-11-06

## 2020-11-06 NOTE — PAYOR COMM NOTE
"REF: [id number]    [facility]  RAMU  [phone]  OR  FAX  [phone]       Renee Zarate (60 y.o. Female)     Date of Birth Social Security Number Address Home Phone MRN    1960  [address] [place] [id number] [phone] [id number]    Jainism Marital Status          [facility] Single       Admission Date Admission Type Admitting Provider Attending Provider Department, Room/Bed    11/2/20 Urgent Tito Lopez DO  [facility] 3C, 391/1    Discharge Date Discharge Disposition Discharge Destination        11/5/2020 Home or Self Care              Attending Provider: (none)   Allergies: Motrin [Ibuprofen], Prednisone    Isolation: None   Infection: None   Code Status: Prior    Ht: 160 cm (63\")   Wt: 118 kg (260 lb)    Admission Cmt: None   Principal Problem: GI bleed [K92.2]                 Active Insurance as of 11/2/2020     Primary Coverage     Payor Plan Insurance Group Employer/Plan Group    [facility] CCN OPTUM      Payor Plan Address Payor Plan Phone Number Payor Plan Fax Number Effective Dates    [address] [phone]  11/1/2020 - None Entered    [facility] [id number]       Subscriber Name Subscriber Birth Date Member ID       RENEE ZARATE 1960 [id number]                 Emergency Contacts      (Rel.) Home Phone Work Phone Mobile Phone    MAGO ZARATE (Sister) [phone] -- [phone]               Discharge Summary      Tito Lopez DO at 11/05/20 1205              [facility] Medicine Services  DISCHARGE SUMMARY       Date of Admission: 11/2/2020  Date of Discharge:  11/5/2020  Primary Care Physician: Curtis Anderson MD    Presenting Problem/History of Present Illness:  Acute on chronic blood loss anemia [D62]     Final Discharge Diagnoses:  Active Hospital Problems    Diagnosis   • **GI bleed   • Hiatal hernia   • Troy ulcer   • MGUS (monoclonal gammopathy of unknown " significance)   • Acute on chronic blood loss anemia   • Morbid obesity with body mass index (BMI) of 50.0 to 59.9 in adult (CMS/[facility])   • COPD (chronic obstructive pulmonary disease) (CMS/[facility])       Consults:   #1 Dr. Mcnulty, surgery  #2 Dr. Larios, GI    Procedures Performed: none    Pertinent Test Results:   none    Chief Complaint on Day of Discharge: f/u symptomatic anemia    History of Present Illness on Day of Discharge:    Patient seen and examined.  Feeling well.  No dizziness, chest pain, shortness of breath.  Energy levels improved.  No rectal or GI bleeding noted.    Hospital Course:  The patient is a 60 y.o. [facility]  She has a history of chronic anemia.  Hiatal hernia.  Troy lesions.  Also has a history of MGUS and follows with hematology.  [facility] due to some shortness of breath and anemia with a hemoglobin of 6.4.  Was apparently 9.8 in August 2020.  She chronically is on iron and is iron deficient.  On arrival here her hemoglobin was 6.4.  She was given 2 units of PRBC transfusion and her hemoglobin came up to 9.1 and stayed stable at that point.  She had no evidence of GI bleeding while here.  Her hemoglobin on discharge is 9.0.  She felt much better after blood transfusion.  No chest pain, dizziness, shortness of breath.  She was seen by her general surgery team who felt no emergent interventions were necessary and [facility] with [facility] center for definitive management and surgery of her hiatal hernia and Troy lesions.  She was seen by GI here but again no interventions performed.  They had no new recommendations.  She has been on a PPI.  We will continue this at discharge.  Otherwise stable will discharge home today.  Follow-up as an outpatient.    Condition on Discharge: Able/improved    Physical Exam on Discharge:  /53 (BP Location: Left arm, Patient Position:  "Lying)   Pulse 58   Temp 97.1 °F (36.2 °C) (Oral)   Resp 18   Ht 160 cm (63\")   Wt 118 kg (260 lb)   SpO2 93%   Breastfeeding No   BMI 46.06 kg/m²   Physical Exam  GEN: Awake, alert, interactive, in NAD  HEENT:  PERRLA, EOMI, Anicteric, Trachea midline  Lungs: CTAB, no wheezing/rales/rhonchi  Heart: RRR, +S1/s2, no rub  ABD: obese, soft, nt/nd, +BS, no guarding/rebound  Extremities: atraumatic, no cyanosis  Skin: no rashes or lesions  Neuro: AAOx3, no focal deficits  Psych: normal mood & affect    Discharge Disposition:  Home or Self Care    Discharge Medications:     Discharge Medications      New Medications      Instructions Start Date   pantoprazole 40 MG EC tablet  Commonly known as: Protonix   40 mg, Oral, Daily         Continue These Medications      Instructions Start Date   albuterol (2.5 MG/3ML) 0.083% nebulizer solution  Commonly known as: PROVENTIL   2.5 mg, Nebulization, Every 4 Hours PRN      albuterol sulfate  (90 Base) MCG/ACT inhaler  Commonly known as: PROVENTIL HFA;VENTOLIN HFA;PROAIR HFA   2 puffs, Inhalation, Every 4 Hours PRN      Cholecalciferol 50 MCG (2000 UT) tablet   4,000 Units, Oral, Daily, 2 tablets once daily      ferrous sulfate 325 (65 FE) MG tablet   325 mg, Oral, Daily With Breakfast      fish oil 1000 MG capsule capsule   1,000 mg, Oral, 2 Times Daily With Meals      furosemide 20 MG tablet  Commonly known as: LASIX   20 mg, Oral, Daily PRN      guaiFENesin 100 MG/5ML solution oral solution  Commonly known as: ROBITUSSIN   100 mg, Oral, Every 6 Hours PRN      ipratropium-albuterol  MCG/ACT inhaler  Commonly known as: COMBIVENT RESPIMAT   1 puff, Inhalation, 4 Times Daily PRN      isosorbide mononitrate 120 MG 24 hr tablet  Commonly known as: IMDUR   120 mg, Oral, Daily      LACTOBACILLUS PO   1 tablet, Oral, Daily      levothyroxine 75 MCG tablet  Commonly known as: SYNTHROID, LEVOTHROID   175 mcg, Oral, Daily      loratadine 10 MG tablet  Commonly known as: " CLARITIN   10 mg, Oral, Daily      MENTHOL-METHYL SALICYLATE EX   1 application, Apply externally, Daily PRN      methocarbamol 500 MG tablet  Commonly known as: ROBAXIN   500 mg, Oral, 2 Times Daily PRN, Take 1-2 tablets by mouth 2 times daily as needed      multivitamin with minerals tablet tablet   1 tablet, Oral, Daily      potassium chloride 20 MEQ packet  Commonly known as: KLOR-CON   20 mEq, Oral, Daily      SAXENDA SC   3 mg, Subcutaneous, Take 0.5mL once daily subcutaneou      simvastatin 20 MG tablet  Commonly known as: ZOCOR   10 mg, Oral, Nightly      traMADol 50 MG tablet  Commonly known as: ULTRAM   50 mg, Oral, 3 Times Daily PRN             Discharge Diet:    Dietary Orders (From admission, onward)     Start     Ordered    11/03/20 1839  Diet Regular; GI Soft  Diet Effective Now     Question Answer Comment   Diet Texture / Consistency Regular    Common Modifiers GI Soft        11/03/20 1838                Activity at Discharge: As prior    Discharge Care Plan/Instructions: PCP and hematology.  Recommend referral to tertiary center for definitive GI/hiatal hernia surgery.    Follow-up Appointments:   No future appointments.    Test Results Pending at Discharge: None    Electronically signed by Tito Lopez DO, 11/05/20, 12:05 CST.    Time: 33 minutes          Electronically signed by Tito Lopez DO at 11/05/20 0433

## 2020-11-06 NOTE — OUTREACH NOTE
Prep Survey      Responses   Hinduism facility patient discharged from?  [place]   Is LACE score < 7 ?  No   Eligibility  Readm Mgmt   Discharge diagnosis  GI bleed, [place] ulcer, acute on chronic blood loss anemia   Does the patient have one of the following disease processes/diagnoses(primary or secondary)?  Other   Does the patient have Home health ordered?  No   Is there a DME ordered?  No   Comments regarding appointments  Per AVS   Prep survey completed?  Yes          Rachel Rodriguez RN

## 2020-11-10 ENCOUNTER — READMISSION MANAGEMENT (OUTPATIENT)
Dept: CALL CENTER | Facility: HOSPITAL | Age: 60
End: 2020-11-10

## 2020-11-10 NOTE — OUTREACH NOTE
Medical Week 1 Survey      Responses   [facility] patient discharged from?  [place]   Does the patient have one of the following disease processes/diagnoses(primary or secondary)?  Other   Week 1 attempt successful?  No   Unsuccessful attempts  Attempt 1          Paula Acosta RN

## 2020-11-11 ENCOUNTER — READMISSION MANAGEMENT (OUTPATIENT)
Dept: CALL CENTER | Facility: HOSPITAL | Age: 60
End: 2020-11-11

## 2020-11-11 NOTE — OUTREACH NOTE
Medical Week 1 Survey      Responses   [facility] patient discharged from?  [place]   Does the patient have one of the following disease processes/diagnoses(primary or secondary)?  Other   Week 1 attempt successful?  No   Unsuccessful attempts  Attempt 2          Zehra Ryan RN

## 2020-11-12 ENCOUNTER — READMISSION MANAGEMENT (OUTPATIENT)
Dept: CALL CENTER | Facility: HOSPITAL | Age: 60
End: 2020-11-12

## 2020-11-12 NOTE — OUTREACH NOTE
Medical Week 1 Survey      Responses   [facility] patient discharged from?  [place]   Does the patient have one of the following disease processes/diagnoses(primary or secondary)?  Other   Week 1 attempt successful?  No   Unsuccessful attempts  Attempt 3          Nai Alvarez RN

## 2020-11-23 ENCOUNTER — READMISSION MANAGEMENT (OUTPATIENT)
Dept: CALL CENTER | Facility: HOSPITAL | Age: 60
End: 2020-11-23

## 2020-11-23 NOTE — OUTREACH NOTE
Medical Week 2 Survey      Responses   [facility] patient discharged from?  [place]   Does the patient have one of the following disease processes/diagnoses(primary or secondary)?  Other   Week 2 attempt successful?  No   Unsuccessful attempts  Attempt 1   Call end time  8690   Week 2 Call Completed?  Yes   Revoked  No further contact(revokes)-requires comment   Graduated/Revoked comments  no answer x 4          Paula Acosta RN

## 2020-11-25 ENCOUNTER — READMISSION MANAGEMENT (OUTPATIENT)
Dept: CALL CENTER | Facility: HOSPITAL | Age: 60
End: 2020-11-25

## 2020-11-25 NOTE — OUTREACH NOTE
"Medical Week 2 Survey      Responses   [facility] patient discharged from?  [place]   Does the patient have one of the following disease processes/diagnoses(primary or secondary)?  Other   Week 2 attempt successful?  Yes   Call start time  1412   Discharge diagnosis  GI bleed, [place] ulcer, acute on chronic blood loss anemia   Call end time  1417   Meds reviewed with patient/caregiver?  Yes   Is the patient having any side effects they believe may be caused by any medication additions or changes?  No   Does the patient have all medications ordered at discharge?  Yes   Is the patient taking all medications as directed (includes completed medication regime)?  Yes   Does the patient have a primary care provider?   Yes   Does the patient have an appointment with their PCP within 7 days of discharge?  Yes   Comments regarding PCP  Patient has not seen PCP due to appt. scheduled when she needed to start iron transfusion.  She plans to do a televisit   Has the patient kept scheduled appointments due by today?  Yes   What is the Home health agency?   Patient has been going every week for an iron infusion.     Psychosocial issues?  No   Did the patient receive a copy of their discharge instructions?  Yes   Nursing interventions  Reviewed instructions with patient   What is the patient's perception of their health status since discharge?  Improving   Is the patient/caregiver able to teach back signs and symptoms related to disease process for when to call PCP?  Yes   Is the patient/caregiver able to teach back signs and symptoms related to disease process for when to call 911?  Yes   Is the patient/caregiver able to teach back the hierarchy of who to call/visit for symptoms/problems? PCP, Specialist, Home health nurse, Urgent Care, ED, 911  Yes   Additional teach back comments  Patient reports she is \"getting up moving around\" she is having normal outputs and she is eating without any issues.   Week 2 Call Completed?  " Yes   Wrap up additional comments  atient reports she is doing well at this time.  She denies needs during this call.           Carolyn Lai RN

## 2020-12-01 ENCOUNTER — READMISSION MANAGEMENT (OUTPATIENT)
Dept: CALL CENTER | Facility: HOSPITAL | Age: 60
End: 2020-12-01

## 2020-12-01 NOTE — OUTREACH NOTE
Medical Week 3 Survey      Responses   [facility] patient discharged from?  [place]   Does the patient have one of the following disease processes/diagnoses(primary or secondary)?  Other   Week 3 attempt successful?  No   Unsuccessful attempts  Attempt 1          Paula Acosta RN

## 2020-12-07 ENCOUNTER — READMISSION MANAGEMENT (OUTPATIENT)
Dept: CALL CENTER | Facility: HOSPITAL | Age: 60
End: 2020-12-07

## 2020-12-08 NOTE — OUTREACH NOTE
Medical Week 3 Survey      Responses   [facility] patient discharged from?  [place]   Does the patient have one of the following disease processes/diagnoses(primary or secondary)?  Other   Week 3 attempt successful?  Yes   Call start time  1922   Call end time  1924   Discharge diagnosis  GI bleed, [place] ulcer, acute on chronic blood loss anemia   Meds reviewed with patient/caregiver?  Yes   Is the patient having any side effects they believe may be caused by any medication additions or changes?  No   Does the patient have all medications ordered at discharge?  Yes   Is the patient taking all medications as directed (includes completed medication regime)?  Yes   Does the patient have a primary care provider?   Yes   Does the patient have an appointment with their PCP within 7 days of discharge?  Yes   Has the patient kept scheduled appointments due by today?  Yes   What is the Home health agency?   Patient has been going every week for an iron infusion.     Has home health visited the patient within 72 hours of discharge?  N/A   Psychosocial issues?  No   Did the patient receive a copy of their discharge instructions?  Yes   Nursing interventions  Reviewed instructions with patient   What is the patient's perception of their health status since discharge?  Improving   Is the patient/caregiver able to teach back signs and symptoms related to disease process for when to call PCP?  Yes   Is the patient/caregiver able to teach back signs and symptoms related to disease process for when to call 911?  Yes   Is the patient/caregiver able to teach back the hierarchy of who to call/visit for symptoms/problems? PCP, Specialist, Home health nurse, Urgent Care, ED, 911  Yes   If the patient is a current smoker, are they able to teach back resources for cessation?  Not a smoker   Additional teach back comments  She is careful with what she eats d/t diarrhea. Iron infusions are giving energy.   Week 3 Call Completed?   Yes   Wrap up additional comments  She is improving.          Paula Acosta RN

## 2020-12-17 ENCOUNTER — TRANSCRIBE ORDERS (OUTPATIENT)
Dept: PHYSICAL THERAPY | Facility: CLINIC | Age: 60
End: 2020-12-17

## 2020-12-17 DIAGNOSIS — I89.0 LYMPHEDEMA, NOT ELSEWHERE CLASSIFIED: Primary | ICD-10-CM

## 2020-12-18 ENCOUNTER — READMISSION MANAGEMENT (OUTPATIENT)
Dept: CALL CENTER | Facility: HOSPITAL | Age: 60
End: 2020-12-18

## 2020-12-18 NOTE — OUTREACH NOTE
Medical Week 4 Survey      Responses   [facility] patient discharged from?  [place]   Does the patient have one of the following disease processes/diagnoses(primary or secondary)?  Other   Week 4 attempt successful?  No          Eunice Jordan RN

## (undated) DEVICE — Device: Brand: DEFENDO AIR/WATER/SUCTION AND BIOPSY VALVE

## (undated) DEVICE — CUFF,BP,DISP,1 TUBE,ADULT,HP: Brand: MEDLINE

## (undated) DEVICE — SENSR O2 OXIMAX FNGR A/ 18IN NONSTR

## (undated) DEVICE — RESUSCITATOR,MANUAL,ADLT,MASK,TUBE RES: Brand: MEDLINE INDUSTRIES, INC.

## (undated) DEVICE — ENDOGATOR AUXILIARY WATER JET CONNECTOR: Brand: ENDOGATOR

## (undated) DEVICE — CONMED SCOPE SAVER BITE BLOCK, 20X27 MM: Brand: SCOPE SAVER

## (undated) DEVICE — THE CHANNEL CLEANING BRUSH IS A NYLON FLEXI BRUSH ATTACHED TO A FLEXIBLE PLASTIC SHEATH DESIGNED TO SAFELY REMOVE DEBRIS FROM FLEXIBLE ENDOSCOPES.

## (undated) DEVICE — YANKAUER,BULB TIP WITH VENT: Brand: ARGYLE

## (undated) DEVICE — TBG SMPL FLTR LINE NASL 02/C02 A/ BX/100